# Patient Record
Sex: FEMALE | Race: OTHER | HISPANIC OR LATINO | Employment: OTHER | URBAN - METROPOLITAN AREA
[De-identification: names, ages, dates, MRNs, and addresses within clinical notes are randomized per-mention and may not be internally consistent; named-entity substitution may affect disease eponyms.]

---

## 2020-09-21 ENCOUNTER — HOSPITAL ENCOUNTER (EMERGENCY)
Facility: HOSPITAL | Age: 70
Discharge: HOME | End: 2020-09-21
Attending: EMERGENCY MEDICINE

## 2020-09-21 VITALS
SYSTOLIC BLOOD PRESSURE: 183 MMHG | RESPIRATION RATE: 16 BRPM | OXYGEN SATURATION: 98 % | DIASTOLIC BLOOD PRESSURE: 80 MMHG | TEMPERATURE: 98.8 F | HEART RATE: 78 BPM

## 2020-09-21 DIAGNOSIS — Z76.0 MEDICATION REFILL: Primary | ICD-10-CM

## 2020-09-21 PROCEDURE — 99281 EMR DPT VST MAYX REQ PHY/QHP: CPT

## 2020-09-21 PROCEDURE — 99284 EMERGENCY DEPT VISIT MOD MDM: CPT | Performed by: EMERGENCY MEDICINE

## 2020-09-21 RX ORDER — LISINOPRIL 20 MG/1
20 TABLET ORAL DAILY
Qty: 30 TABLET | Refills: 0 | Status: SHIPPED | OUTPATIENT
Start: 2020-09-21 | End: 2021-01-17 | Stop reason: SDUPTHER

## 2020-09-21 NOTE — DISCHARGE INSTRUCTIONS
If you plan to stay longer, need evaluation, please establish care with family doctor  Return to ER if any symptoms

## 2020-09-21 NOTE — ED PROVIDER NOTES
History  Chief Complaint   Patient presents with    Medication Refill     Pt reports traveling from [facility]   pt had last lisinopril pill today  pt reports she needs more  Pt denies cp/sob  80-year-old female presenting with request for medication refill  Patient is visiting from [facility], Polish-speaking only, daughter at bedside translating  Reports that she takes 20 mg lisinopril daily and took her last dose today  She just traveled here yesterday and is planning to return in about 1 month  Her only other medication is a baby aspirin daily  She offers no complaints  Specifically denies any fevers, chills, headache, CP, SOB, cough, abdominal pain, leg pain or swelling  Will refill prescription, information provided to establish care with the clinic if she stays longer and needs further medication          None       Past Medical History:   Diagnosis Date    Hypertension        History reviewed  No pertinent surgical history  History reviewed  No pertinent family history  I have reviewed and agree with the history as documented  E-Cigarette/Vaping     E-Cigarette/Vaping Substances     Social History     Tobacco Use    Smoking status: Never Smoker    Smokeless tobacco: Never Used   Substance Use Topics    Alcohol use: Never     Frequency: Never    Drug use: Never       Review of Systems   Constitutional: Negative for chills and fever  HENT: Negative for rhinorrhea and sore throat  Respiratory: Negative for cough and shortness of breath  Cardiovascular: Negative for chest pain and leg swelling  Gastrointestinal: Negative for abdominal pain, nausea and vomiting  Genitourinary: Negative for dysuria and urgency  Musculoskeletal: Negative for back pain and neck pain  Skin: Negative for color change and rash  Allergic/Immunologic: Negative for immunocompromised state  Neurological: Negative for weakness, numbness and headaches  Hematological: Negative for adenopathy  Does not bruise/bleed easily  Psychiatric/Behavioral: Negative for agitation and confusion  All other systems reviewed and are negative  Physical Exam  Physical Exam  Vitals signs and nursing note reviewed  Constitutional:       Appearance: Normal appearance  She is well-developed  HENT:      Head: Normocephalic and atraumatic  Nose: Nose normal    Eyes:      Conjunctiva/sclera: Conjunctivae normal    Neck:      Musculoskeletal: Normal range of motion and neck supple  Cardiovascular:      Rate and Rhythm: Normal rate and regular rhythm  Heart sounds: Normal heart sounds  Pulmonary:      Effort: Pulmonary effort is normal  No respiratory distress  Breath sounds: Normal breath sounds  Abdominal:      General: There is no distension  Palpations: Abdomen is soft  Musculoskeletal:         General: No tenderness or deformity  Skin:     General: Skin is warm and dry  Findings: No rash  Neurological:      Mental Status: She is alert and oriented to person, place, and time  Motor: No abnormal muscle tone  Coordination: Coordination normal    Psychiatric:         Thought Content:  Thought content normal          Judgment: Judgment normal          Vital Signs  ED Triage Vitals [09/21/20 1543]   Temperature Pulse Respirations Blood Pressure SpO2   98 8 °F (37 1 °C) 78 16 (!) 183/80 98 %      Temp Source Heart Rate Source Patient Position - Orthostatic VS BP Location FiO2 (%)   Temporal Monitor Sitting Right arm --      Pain Score       --           Vitals:    09/21/20 1543   BP: (!) 183/80   Pulse: 78   Patient Position - Orthostatic VS: Sitting         Visual Acuity      ED Medications  Medications - No data to display    Diagnostic Studies  Results Reviewed     None                 No orders to display              Procedures  Procedures         ED Course                                       MDM  Number of Diagnoses or Management Options  Medication refill: Diagnosis management comments: 78 yo F presenting with request for Lisinopril refill as she is visiting from [place]  No complaints  Disposition  Final diagnoses:   Medication refill     Time reflects when diagnosis was documented in both MDM as applicable and the Disposition within this note     Time User Action Codes Description Comment    9/21/2020  3:47 PM Emeritaalfredo Mcgarry Add [Z76 0] Medication refill       ED Disposition     ED Disposition Condition Date/Time Comment    Discharge Stable Mon Sep 21, 2020  3:47 PM Marcus Patel discharge to home/self care  Follow-up Information     Follow up With Specialties Details Why Contact Info Additional [address] [facility]   [address], [address] [facility] [address]  [address], [address], [address] [place], [place], [address]          Discharge Medication List as of 9/21/2020  3:48 PM      START taking these medications    Details   lisinopril (ZESTRIL) 20 mg tablet Take 1 tablet (20 mg total) by mouth daily, Starting Mon 9/21/2020, Print           No discharge procedures on file      PDMP Review     None          ED Provider  Electronically Signed by           Smitha Cadena DO  09/22/20 7182

## 2021-01-17 ENCOUNTER — HOSPITAL ENCOUNTER (EMERGENCY)
Facility: HOSPITAL | Age: 71
Discharge: HOME | End: 2021-01-17
Attending: EMERGENCY MEDICINE | Admitting: EMERGENCY MEDICINE

## 2021-01-17 VITALS
SYSTOLIC BLOOD PRESSURE: 174 MMHG | HEART RATE: 78 BPM | TEMPERATURE: 97.5 F | WEIGHT: 128.31 LBS | RESPIRATION RATE: 18 BRPM | DIASTOLIC BLOOD PRESSURE: 96 MMHG | OXYGEN SATURATION: 96 %

## 2021-01-17 DIAGNOSIS — H10.9 CONJUNCTIVITIS OF BOTH EYES, UNSPECIFIED CONJUNCTIVITIS TYPE: Primary | ICD-10-CM

## 2021-01-17 DIAGNOSIS — R21 RASH AND NONSPECIFIC SKIN ERUPTION: ICD-10-CM

## 2021-01-17 DIAGNOSIS — Z76.0 MEDICATION REFILL: ICD-10-CM

## 2021-01-17 PROCEDURE — 99282 EMERGENCY DEPT VISIT SF MDM: CPT | Performed by: PHYSICIAN ASSISTANT

## 2021-01-17 PROCEDURE — 99283 EMERGENCY DEPT VISIT LOW MDM: CPT

## 2021-01-17 RX ORDER — POLYMYXIN B SULFATE AND TRIMETHOPRIM 1; 10000 MG/ML; [USP'U]/ML
1 SOLUTION OPHTHALMIC EVERY 4 HOURS
Qty: 10 ML | Refills: 0 | Status: SHIPPED | OUTPATIENT
Start: 2021-01-17

## 2021-01-17 RX ORDER — LISINOPRIL 20 MG/1
20 TABLET ORAL DAILY
Qty: 30 TABLET | Refills: 0 | Status: SHIPPED | OUTPATIENT
Start: 2021-01-17 | End: 2021-04-20 | Stop reason: SDUPTHER

## 2021-01-17 NOTE — ED PROVIDER NOTES
History  Chief Complaint   Patient presents with   [address] speaking only  Pt daughter accompanying her  Pt reports redness of eyes x 4 days  Pt reports itching of eyes x 4 days  Pt now has a rash on L temple that formed after the itching began   Medical Problem     Pt is visiting here from [address] has been here for 3 monthes visiting her daughter  Pt has a hx of htn  Pt has 2 lisinipril 20 mg left  Pt would like a refill of her lisinpril  78 yo female presents to the ED for evaluation bilateral conjunctival redness, eye pruritus, eye tearing, and eye crusting upon waking up x 2 weeks  Patient denies any insinuating factors at onset to include chemical or trauma exposure to the eye  Patient denies use of any medications for her symptoms  Patient denies any changes to visual acuity  Patient also presents for a a nonspecific skin rash near the left cheekbone for the past several days  Patient reports using petroleum cream lotion without much improvement her symptoms  Patient denies history of similar symptoms in the past   Patient denies any allergic contacts  Patient is also requesting refill for lisinopril 20 mg q d  Cornell Wagoner Patient currently is visiting her daughter from [facility] and will not be getting back to [facility] until the end of the month, and only has 2 tablets of lisinopril left  Patient reports that she has a PCP back in [facility]   At this time patient denies any chest pain, shortness of breath, dyspnea, nausea, vomiting and GI/ complaints        History provided by:  Patient   used: Yes    Medical Problem  Associated symptoms: rash    Associated symptoms: no abdominal pain, no chest pain, no congestion, no cough, no diarrhea, no ear pain, no fatigue, no fever, no headaches, no myalgias, no nausea, no rhinorrhea, no shortness of breath, no sore throat and no vomiting        Prior to Admission Medications   Prescriptions Last Dose Informant Patient Reported? Taking?   lisinopril (ZESTRIL) 20 mg tablet   No No   Sig: Take 1 tablet (20 mg total) by mouth daily   lisinopril (ZESTRIL) 20 mg tablet   No No   Sig: Take 1 tablet (20 mg total) by mouth daily      Facility-Administered Medications: None       Past Medical History:   Diagnosis Date    Hypertension        History reviewed  No pertinent surgical history  History reviewed  No pertinent family history  I have reviewed and agree with the history as documented  E-Cigarette/Vaping     E-Cigarette/Vaping Substances     Social History     Tobacco Use    Smoking status: Never Smoker    Smokeless tobacco: Never Used   Substance Use Topics    Alcohol use: Never     Frequency: Never    Drug use: Never       Review of Systems   Constitutional: Negative for chills, diaphoresis, fatigue and fever  HENT: Negative for congestion, ear pain, rhinorrhea, sneezing and sore throat  Eyes: Positive for discharge, redness and itching  Negative for pain  Respiratory: Negative for cough and shortness of breath  Cardiovascular: Negative for chest pain and palpitations  Gastrointestinal: Negative for abdominal pain, constipation, diarrhea, nausea and vomiting  Genitourinary: Negative for difficulty urinating, dysuria and hematuria  Musculoskeletal: Negative for back pain, myalgias and neck pain  Skin: Positive for rash and wound  Negative for color change and pallor  Allergic/Immunologic: Negative for immunocompromised state  Neurological: Negative for dizziness, weakness, light-headedness, numbness and headaches  Hematological: Negative for adenopathy  Does not bruise/bleed easily  Psychiatric/Behavioral: Negative for behavioral problems  Physical Exam  Physical Exam  Vitals signs and nursing note reviewed  Constitutional:       General: She is not in acute distress  Appearance: Normal appearance  She is well-developed   She is not ill-appearing, toxic-appearing or diaphoretic  HENT:      Head: Normocephalic and atraumatic  Comments: Patient has a chronic mole at upper vermilion border  Right Ear: Tympanic membrane, ear canal and external ear normal  There is no impacted cerumen  Left Ear: Tympanic membrane, ear canal and external ear normal  There is no impacted cerumen  Nose: Nose normal  No congestion or rhinorrhea  Mouth/Throat:      Mouth: Mucous membranes are moist       Pharynx: Oropharynx is clear  No oropharyngeal exudate or posterior oropharyngeal erythema  Eyes:      General: No scleral icterus  Right eye: No foreign body or discharge  Left eye: No foreign body or discharge  Extraocular Movements: Extraocular movements intact  Right eye: Normal extraocular motion and no nystagmus  Left eye: Normal extraocular motion and no nystagmus  Conjunctiva/sclera:      Right eye: Right conjunctiva is injected  Left eye: Left conjunctiva is injected  Pupils: Pupils are equal, round, and reactive to light  Comments: Mild bilateral eye is conjunctival erythema  Mild clear discharge  No periorbital edema or erythema  Visual acuity grossly intact  Patient also has a nonspecific skin rash eruption on the left upper cheek bone  Rash is maculopapular and crusting with ill-defined borders  No evidence of abscess or cellulitis  Neck:      Musculoskeletal: Normal range of motion and neck supple  No neck rigidity  Cardiovascular:      Rate and Rhythm: Normal rate and regular rhythm  Pulses: Normal pulses  Heart sounds: Normal heart sounds  No murmur  Pulmonary:      Effort: Pulmonary effort is normal  No respiratory distress  Breath sounds: Normal breath sounds  No wheezing or rales  Abdominal:      Palpations: Abdomen is soft  Tenderness: There is no abdominal tenderness  Musculoskeletal:         General: No deformity or signs of injury        Right lower leg: No edema  Left lower leg: No edema  Lymphadenopathy:      Cervical: No cervical adenopathy  Skin:     General: Skin is warm and dry  Capillary Refill: Capillary refill takes less than 2 seconds  Coloration: Skin is not jaundiced or pale  Findings: Rash present  Neurological:      Mental Status: She is alert and oriented to person, place, and time  Sensory: No sensory deficit  Motor: No weakness  Gait: Gait normal    Psychiatric:         Mood and Affect: Mood normal          Behavior: Behavior normal          Vital Signs  ED Triage Vitals [01/17/21 1531]   Temperature Pulse Respirations Blood Pressure SpO2   97 5 °F (36 4 °C) 78 18 (!) 174/96 96 %      Temp Source Heart Rate Source Patient Position - Orthostatic VS BP Location FiO2 (%)   Tympanic Monitor Sitting Left arm --      Pain Score       Worst Possible Pain           Vitals:    01/17/21 1531   BP: (!) 174/96   Pulse: 78   Patient Position - Orthostatic VS: Sitting         Visual Acuity  Visual Acuity      Most Recent Value   Visual acuity R eye is  20/40   Visual acuity Left eye is  20/50   Visual acuity in both eyes is  20/40   Wearing corrective eyewear/lenses? No   No corrective eyewear/lenses  Yes          ED Medications  Medications - No data to display    Diagnostic Studies  Results Reviewed     None                 No orders to display              Procedures  Procedures         ED Course                                           MDM  Number of Diagnoses or Management Options  Conjunctivitis of both eyes, unspecified conjunctivitis type: new and requires workup  Medication refill: new and requires workup  Rash and nonspecific skin eruption: new and requires workup  Diagnosis management comments: 80-year-old female presents to ED for multiple complaints 1st is bilateral eye complaints consistent with conjunctivitis   Advised patient of likely cause of symptoms is viral conjunctivitis considering history and physical exam findings  However will dispo with polymyxin eye drops  Regarding patient's nonspecific skin rash near left upper cheek bone advised patient that carcinoma cannot be ruled out at this time and is advised for her to follow up with the dermatology while she is here in the states or when she returns to [facility]   However, will treat with mupirocin to treat for other possible differential diagnosis of impetigo  Will refill her lisinopril 20 mg QD x 30 days  Advised patient that if she has to stay in the local area any longer that she should establish a primary care provider  Patient is stable  Vital signs stable  Patient stable at discharge  Vital signs stable at discharge  Discussed the most likely cause of current symptoms  Discussed in detail any red flag signs and symptoms that would require immediate follow-up care in the emergency room  Instructed to follow-up with primary care provider for reevaluation  Discussed all prescribed medications to include doses, use, and route  Patient was satisfied with discharge plan and was provided the opportunity to inquire about any questions or concerns regarding ED visit          Amount and/or Complexity of Data Reviewed  Review and summarize past medical records: yes  Discuss the patient with other providers: yes    Risk of Complications, Morbidity, and/or Mortality  Presenting problems: moderate  Diagnostic procedures: moderate  Management options: moderate    Patient Progress  Patient progress: stable      Disposition  Final diagnoses:   Conjunctivitis of both eyes, unspecified conjunctivitis type   Rash and nonspecific skin eruption   Medication refill     Time reflects when diagnosis was documented in both MDM as applicable and the Disposition within this note     Time User Action Codes Description Comment    1/17/2021  4:14 PM Burnis Viviane Add [H10 9] Conjunctivitis of both eyes, unspecified conjunctivitis type     1/17/2021  4:14 PM Leocadia Ruel Add [R21] Rash and nonspecific skin eruption     1/17/2021  4:15 PM Leocadia Ruel Add [Z76 0] Medication refill       ED Disposition     ED Disposition Condition Date/Time Comment    Discharge Stable Sun Jan 17, 2021  4:14 PM Vanice Alert Lexiiollet discharge to home/self care  Follow-up Information     Follow up With Specialties Details Why Contact Info Additional [address]   [address] [facility] [address] [facility] [address]  [facility] [address] , [facility] [address]          Patient's Medications   Discharge Prescriptions    MUPIROCIN (BACTROBAN) 2 % OINTMENT    Apply topically 3 (three) times a day       Start Date: 1/17/2021 End Date: --       Order Dose: --       Quantity: 15 g    Refills: 0    POLYMYXIN B-TRIMETHOPRIM (POLYTRIM) OPHTHALMIC SOLUTION    Administer 1 drop to both eyes every 4 (four) hours       Start Date: 1/17/2021 End Date: --       Order Dose: 1 drop       Quantity: 10 mL    Refills: 0     No discharge procedures on file      PDMP Review     None          ED Provider  Electronically Signed by           Fanny Abebe PA-C  01/17/21 4267

## 2021-03-06 ENCOUNTER — HOSPITAL ENCOUNTER (EMERGENCY)
Facility: HOSPITAL | Age: 71
Discharge: HOME | End: 2021-03-06
Attending: EMERGENCY MEDICINE

## 2021-03-06 VITALS
RESPIRATION RATE: 16 BRPM | OXYGEN SATURATION: 98 % | DIASTOLIC BLOOD PRESSURE: 74 MMHG | WEIGHT: 124.78 LBS | SYSTOLIC BLOOD PRESSURE: 189 MMHG | HEART RATE: 80 BPM | TEMPERATURE: 98.2 F

## 2021-03-06 DIAGNOSIS — T78.3XXA ANGIOEDEMA, INITIAL ENCOUNTER: Primary | ICD-10-CM

## 2021-03-06 PROCEDURE — 96361 HYDRATE IV INFUSION ADD-ON: CPT

## 2021-03-06 PROCEDURE — 99284 EMERGENCY DEPT VISIT MOD MDM: CPT | Performed by: PHYSICIAN ASSISTANT

## 2021-03-06 PROCEDURE — 99283 EMERGENCY DEPT VISIT LOW MDM: CPT

## 2021-03-06 PROCEDURE — 96375 TX/PRO/DX INJ NEW DRUG ADDON: CPT

## 2021-03-06 PROCEDURE — 96374 THER/PROPH/DIAG INJ IV PUSH: CPT

## 2021-03-06 RX ORDER — DIPHENHYDRAMINE HYDROCHLORIDE 50 MG/ML
12.5 INJECTION INTRAMUSCULAR; INTRAVENOUS ONCE
Status: COMPLETED | OUTPATIENT
Start: 2021-03-06 | End: 2021-03-06

## 2021-03-06 RX ORDER — SODIUM CHLORIDE 9 MG/ML
250 INJECTION, SOLUTION INTRAVENOUS CONTINUOUS
Status: DISCONTINUED | OUTPATIENT
Start: 2021-03-06 | End: 2021-03-06 | Stop reason: HOSPADM

## 2021-03-06 RX ORDER — DIPHENHYDRAMINE HCL 25 MG
25 TABLET ORAL EVERY 6 HOURS PRN
Qty: 30 TABLET | Refills: 0 | Status: SHIPPED | OUTPATIENT
Start: 2021-03-06

## 2021-03-06 RX ORDER — PREDNISONE 10 MG/1
TABLET ORAL
Qty: 30 TABLET | Refills: 0 | OUTPATIENT
Start: 2021-03-06 | End: 2021-04-20

## 2021-03-06 RX ORDER — METHYLPREDNISOLONE SODIUM SUCCINATE 125 MG/2ML
125 INJECTION, POWDER, LYOPHILIZED, FOR SOLUTION INTRAMUSCULAR; INTRAVENOUS ONCE
Status: COMPLETED | OUTPATIENT
Start: 2021-03-06 | End: 2021-03-06

## 2021-03-06 RX ADMIN — DIPHENHYDRAMINE HYDROCHLORIDE 12.5 MG: 50 INJECTION, SOLUTION INTRAMUSCULAR; INTRAVENOUS at 15:24

## 2021-03-06 RX ADMIN — SODIUM CHLORIDE 250 ML/HR: 0.9 INJECTION, SOLUTION INTRAVENOUS at 15:25

## 2021-03-06 RX ADMIN — METHYLPREDNISOLONE SODIUM SUCCINATE 125 MG: 125 INJECTION, POWDER, FOR SOLUTION INTRAMUSCULAR; INTRAVENOUS at 15:24

## 2021-03-06 NOTE — ED PROVIDER NOTES
History  Chief Complaint   Patient presents with    Allergic Reaction     States this morning she ate normally, while she was cleaning she began to feel her lips swell, painful to touch  Denies new lotions, soaps, foods, perfumes  Denies SOB, no itching  Pt with upper and lower lip swelling aroung lunchtime today,  Pt is on lisinopril for htn            None       Past Medical History:   Diagnosis Date    Hypertension        Past Surgical History:   Procedure Laterality Date     SECTION         History reviewed  No pertinent family history  I have reviewed and agree with the history as documented  E-Cigarette/Vaping     E-Cigarette/Vaping Substances     Social History     Tobacco Use    Smoking status: Never Smoker    Smokeless tobacco: Never Used   Substance Use Topics    Alcohol use: Never     Frequency: Never    Drug use: Never       Review of Systems   Constitutional: Negative  HENT: Negative  Eyes: Negative  Respiratory: Negative  Cardiovascular: Negative  Gastrointestinal: Negative  Endocrine: Negative  Genitourinary: Negative  Musculoskeletal: Negative  Skin: Negative  Allergic/Immunologic: Negative  Neurological: Negative  Hematological: Negative  All other systems reviewed and are negative  Physical Exam  Physical Exam  Vitals signs and nursing note reviewed  Constitutional:       Appearance: Normal appearance  She is normal weight  Comments: 450pm pt is feeling better  Swelling much improved  Still with minimal swelling    HENT:      Head: Normocephalic and atraumatic  Right Ear: Tympanic membrane, ear canal and external ear normal       Left Ear: Tympanic membrane, ear canal and external ear normal       Mouth/Throat:      Mouth: Mucous membranes are moist       Pharynx: Oropharynx is clear        Comments: Upper lip swelling moderate  Lower lip minor swelling  Tongue and pharynx wnl   Eyes:      Extraocular Movements: Extraocular movements intact  Conjunctiva/sclera: Conjunctivae normal       Pupils: Pupils are equal, round, and reactive to light  Neck:      Musculoskeletal: Normal range of motion and neck supple  Cardiovascular:      Rate and Rhythm: Normal rate and regular rhythm  Pulses: Normal pulses  Heart sounds: Normal heart sounds  Pulmonary:      Effort: Pulmonary effort is normal    Abdominal:      General: Abdomen is flat  Bowel sounds are normal       Palpations: Abdomen is soft  Musculoskeletal: Normal range of motion  Skin:     General: Skin is warm  Capillary Refill: Capillary refill takes less than 2 seconds  Neurological:      General: No focal deficit present  Mental Status: She is alert and oriented to person, place, and time  Vital Signs  ED Triage Vitals [03/06/21 1457]   Temperature Pulse Respirations Blood Pressure SpO2   98 2 °F (36 8 °C) 90 20 (!) 130/101 98 %      Temp Source Heart Rate Source Patient Position - Orthostatic VS BP Location FiO2 (%)   Tympanic Monitor Sitting Left arm --      Pain Score       --           Vitals:    03/06/21 1457 03/06/21 1602 03/06/21 1740   BP: (!) 130/101 (!) 190/98 (!) 189/74   Pulse: 90  80   Patient Position - Orthostatic VS: Sitting  Sitting         Visual Acuity      ED Medications  Medications   diphenhydrAMINE (BENADRYL) injection 12 5 mg (12 5 mg Intravenous Given 3/6/21 1524)   methylPREDNISolone sodium succinate (Solu-MEDROL) injection 125 mg (125 mg Intravenous Given 3/6/21 1524)       Diagnostic Studies  Results Reviewed     None                 No orders to display              Procedures  Procedures         ED Course                             SBIRT 22yo+      Most Recent Value   SBIRT (24 yo +)   In order to provide better care to our patients, we are screening all of our patients for alcohol and drug use  Would it be okay to ask you these screening questions?   Yes Filed at: 03/06/2021 1528   Initial Alcohol Screen: US AUDIT-C    1  How often do you have a drink containing alcohol?  0 Filed at: 03/06/2021 1528   2  How many drinks containing alcohol do you have on a typical day you are drinking? 0 Filed at: 03/06/2021 1528   3a  Male UNDER 65: How often do you have five or more drinks on one occasion? 0 Filed at: 03/06/2021 1528   3b  FEMALE Any Age, or MALE 65+: How often do you have 4 or more drinks on one occassion? 0 Filed at: 03/06/2021 1528   Audit-C Score  0 Filed at: 03/06/2021 1528   MILKA: How many times in the past year have you    Used an illegal drug or used a prescription medication for non-medical reasons? Never Filed at: 03/06/2021 1528                    MDM    Disposition  Final diagnoses:   Angioedema, initial encounter     Time reflects when diagnosis was documented in both MDM as applicable and the Disposition within this note     Time User Action Codes Description Comment    3/6/2021  4:54 PM Paul Stoll [T78  3XXA] Angioedema, initial encounter       ED Disposition     ED Disposition Condition Date/Time Comment    Discharge Stable Sat Mar 6, 2021  4:54 PM Ramiro Burley discharge to home/self care  Follow-up Information     Follow up With Specialties Details Why Contact Info    Saira Rivero MD Family Medicine  stop the lisinopril  talk to family doctor for new hypertension [facility] [address] [phone]            Discharge Medication List as of 3/6/2021  4:56 PM      START taking these medications    Details   diphenhydrAMINE (BENADRYL) 25 mg tablet Take 1 tablet (25 mg total) by mouth every 6 (six) hours as needed for itching, Starting Sat 3/6/2021, Print      predniSONE 10 mg tablet 5 tabs po qd x 2 days then 4 tabs po qd x 2 days then 3 tabs po qd x 2 days then 2 tabs po qd x 2 days then 1 tab po qd x 2 days, Print           No discharge procedures on file      PDMP Review     None          ED Provider  Electronically Signed by Darrius Herrera PA-C  03/07/21 5806

## 2021-03-06 NOTE — ED NOTES
reporte to provide Delorise Gibes) the Patients BP was confirmed manually as well with a BP cuff 190/100 as requested by provider  No neuro deficits or complaints of pain Vital signs stable no respiratory distress, stridor, or SOB  Respirations easy non-labored       Samson Ontiveros RN  03/06/21 3756

## 2021-04-12 ENCOUNTER — HOSPITAL ENCOUNTER (EMERGENCY)
Facility: HOSPITAL | Age: 71
Discharge: HOME | End: 2021-04-12
Attending: EMERGENCY MEDICINE | Admitting: EMERGENCY MEDICINE
Payer: MEDICARE

## 2021-04-12 ENCOUNTER — APPOINTMENT (EMERGENCY)
Dept: CT IMAGING | Facility: HOSPITAL | Age: 71
End: 2021-04-12
Payer: MEDICARE

## 2021-04-12 VITALS
SYSTOLIC BLOOD PRESSURE: 144 MMHG | TEMPERATURE: 97.7 F | DIASTOLIC BLOOD PRESSURE: 79 MMHG | RESPIRATION RATE: 19 BRPM | OXYGEN SATURATION: 100 % | HEART RATE: 74 BPM | WEIGHT: 116 LBS

## 2021-04-12 DIAGNOSIS — R53.1 WEAKNESS: Primary | ICD-10-CM

## 2021-04-12 DIAGNOSIS — N39.0 URINARY TRACT INFECTION: ICD-10-CM

## 2021-04-12 LAB
ALBUMIN SERPL BCP-MCNC: 4.2 G/DL (ref 3–5.2)
ALP SERPL-CCNC: 90 U/L (ref 43–122)
ALT SERPL W P-5'-P-CCNC: 15 U/L
ANION GAP SERPL CALCULATED.3IONS-SCNC: 10 MMOL/L (ref 5–14)
AST SERPL W P-5'-P-CCNC: 24 U/L (ref 14–36)
BACTERIA UR QL AUTO: ABNORMAL /HPF
BASOPHILS # BLD AUTO: 0.1 THOUSANDS/ΜL (ref 0–0.1)
BASOPHILS NFR BLD AUTO: 1 % (ref 0–1)
BILIRUB SERPL-MCNC: 0.52 MG/DL
BILIRUB UR QL STRIP: NEGATIVE
BUN SERPL-MCNC: 14 MG/DL (ref 5–25)
CALCIUM SERPL-MCNC: 9.9 MG/DL (ref 8.4–10.2)
CAOX CRY URNS QL MICRO: ABNORMAL /HPF
CHLORIDE SERPL-SCNC: 100 MMOL/L (ref 97–108)
CLARITY UR: ABNORMAL
CO2 SERPL-SCNC: 28 MMOL/L (ref 22–30)
COLOR UR: ABNORMAL
CREAT SERPL-MCNC: 0.69 MG/DL (ref 0.6–1.2)
EOSINOPHIL # BLD AUTO: 0.3 THOUSAND/ΜL (ref 0–0.4)
EOSINOPHIL NFR BLD AUTO: 2 % (ref 0–6)
ERYTHROCYTE [DISTWIDTH] IN BLOOD BY AUTOMATED COUNT: 12.9 %
GFR SERPL CREATININE-BSD FRML MDRD: 88 ML/MIN/1.73SQ M
GLUCOSE SERPL-MCNC: 131 MG/DL (ref 70–99)
GLUCOSE UR STRIP-MCNC: NEGATIVE MG/DL
HCT VFR BLD AUTO: 41.3 % (ref 36–46)
HGB BLD-MCNC: 13.9 G/DL (ref 12–16)
HGB UR QL STRIP.AUTO: 10
KETONES UR STRIP-MCNC: NEGATIVE MG/DL
LEUKOCYTE ESTERASE UR QL STRIP: 500
LYMPHOCYTES # BLD AUTO: 2.9 THOUSANDS/ΜL (ref 0.5–4)
LYMPHOCYTES NFR BLD AUTO: 26 % (ref 25–45)
MCH RBC QN AUTO: 30.3 PG (ref 26–34)
MCHC RBC AUTO-ENTMCNC: 33.7 G/DL (ref 31–36)
MCV RBC AUTO: 90 FL (ref 80–100)
MONOCYTES # BLD AUTO: 0.9 THOUSAND/ΜL (ref 0.2–0.9)
MONOCYTES NFR BLD AUTO: 8 % (ref 1–10)
MUCOUS THREADS UR QL AUTO: ABNORMAL
NEUTROPHILS # BLD AUTO: 7 THOUSANDS/ΜL (ref 1.8–7.8)
NEUTS SEG NFR BLD AUTO: 63 % (ref 45–65)
NITRITE UR QL STRIP: NEGATIVE
NON-SQ EPI CELLS URNS QL MICRO: ABNORMAL /HPF
PH UR STRIP.AUTO: 6 [PH]
PLATELET # BLD AUTO: 276 THOUSANDS/UL (ref 150–450)
PMV BLD AUTO: 8 FL (ref 8.9–12.7)
POTASSIUM SERPL-SCNC: 3.5 MMOL/L (ref 3.6–5)
PROT SERPL-MCNC: 7.9 G/DL (ref 5.9–8.4)
PROT UR STRIP-MCNC: ABNORMAL MG/DL
RBC # BLD AUTO: 4.58 MILLION/UL (ref 4–5.2)
RBC #/AREA URNS AUTO: ABNORMAL /HPF
SODIUM SERPL-SCNC: 138 MMOL/L (ref 137–147)
SP GR UR STRIP.AUTO: 1.02 (ref 1–1.04)
TROPONIN I SERPL-MCNC: <0.01 NG/ML (ref 0–0.03)
UROBILINOGEN UA: NEGATIVE MG/DL
WBC # BLD AUTO: 11.2 THOUSAND/UL (ref 4.5–11)
WBC #/AREA URNS AUTO: ABNORMAL /HPF

## 2021-04-12 PROCEDURE — 99284 EMERGENCY DEPT VISIT MOD MDM: CPT

## 2021-04-12 PROCEDURE — 93005 ELECTROCARDIOGRAM TRACING: CPT

## 2021-04-12 PROCEDURE — 85025 COMPLETE CBC W/AUTO DIFF WBC: CPT | Performed by: PHYSICIAN ASSISTANT

## 2021-04-12 PROCEDURE — 80053 COMPREHEN METABOLIC PANEL: CPT | Performed by: PHYSICIAN ASSISTANT

## 2021-04-12 PROCEDURE — 70450 CT HEAD/BRAIN W/O DYE: CPT

## 2021-04-12 PROCEDURE — 36415 COLL VENOUS BLD VENIPUNCTURE: CPT | Performed by: PHYSICIAN ASSISTANT

## 2021-04-12 PROCEDURE — G1004 CDSM NDSC: HCPCS

## 2021-04-12 PROCEDURE — 74177 CT ABD & PELVIS W/CONTRAST: CPT

## 2021-04-12 PROCEDURE — 99284 EMERGENCY DEPT VISIT MOD MDM: CPT | Performed by: PHYSICIAN ASSISTANT

## 2021-04-12 PROCEDURE — 96360 HYDRATION IV INFUSION INIT: CPT

## 2021-04-12 PROCEDURE — 84484 ASSAY OF TROPONIN QUANT: CPT | Performed by: PHYSICIAN ASSISTANT

## 2021-04-12 PROCEDURE — 81003 URINALYSIS AUTO W/O SCOPE: CPT | Performed by: PHYSICIAN ASSISTANT

## 2021-04-12 PROCEDURE — 81001 URINALYSIS AUTO W/SCOPE: CPT | Performed by: PHYSICIAN ASSISTANT

## 2021-04-12 RX ORDER — ACETAMINOPHEN 500 MG
1000 TABLET ORAL EVERY 6 HOURS PRN
Qty: 30 TABLET | Refills: 0 | Status: SHIPPED | OUTPATIENT
Start: 2021-04-12

## 2021-04-12 RX ORDER — NITROFURANTOIN 25; 75 MG/1; MG/1
100 CAPSULE ORAL 2 TIMES DAILY
Qty: 10 CAPSULE | Refills: 0 | OUTPATIENT
Start: 2021-04-12 | End: 2021-04-20

## 2021-04-12 RX ADMIN — IOHEXOL 100 ML: 350 INJECTION, SOLUTION INTRAVENOUS at 20:42

## 2021-04-12 RX ADMIN — SODIUM CHLORIDE 1000 ML: 0.9 INJECTION, SOLUTION INTRAVENOUS at 19:48

## 2021-04-13 LAB
ATRIAL RATE: 75 BPM
P AXIS: 55 DEGREES
PR INTERVAL: 162 MS
QRS AXIS: 43 DEGREES
QRSD INTERVAL: 74 MS
QT INTERVAL: 396 MS
QTC INTERVAL: 442 MS
T WAVE AXIS: 26 DEGREES
VENTRICULAR RATE: 75 BPM

## 2021-04-13 PROCEDURE — 93010 ELECTROCARDIOGRAM REPORT: CPT | Performed by: INTERNAL MEDICINE

## 2021-04-13 NOTE — ED NOTES
Pt left ambulatory in stable condition, daughter taking pt home      Claire Bird, [address] [facility]  04/12/21 9293

## 2021-04-13 NOTE — ED PROVIDER NOTES
History  Chief Complaint   Patient presents with    Numbness     pt c/o decreased appetite and generalized weakness x 3 days  pt developed pain at back of head, radiating into her left arm yesterday  pt developed numbness in left hand at same time, is able use hand normal        61-year-old female presents to the ED for evaluation of multiple complaints to include decreased appetite, generalized weakness, urinary frequency, suprapubic tenderness, left hand numbness, head pain, and visual disturbance x 3 days  Patient reports that all symptoms have been gradual in onset  Hand pain described as diffuse extends bilateral into neck region  Visual disturbances described as blurriness  with normal visual acuity, and without eye pain  Denies history of similar symptoms in the past   Denies any fever, chest pain, shortness of breath, nausea, vomiting, hematuria and hematochezia  History provided by:  Patient   used: No        Prior to Admission Medications   Prescriptions Last Dose Informant Patient Reported? Taking? diphenhydrAMINE (BENADRYL) 25 mg tablet   No No   Sig: Take 1 tablet (25 mg total) by mouth every 6 (six) hours as needed for itching   predniSONE 10 mg tablet   No No   Si tabs po qd x 2 days then 4 tabs po qd x 2 days then 3 tabs po qd x 2 days then 2 tabs po qd x 2 days then 1 tab po qd x 2 days      Facility-Administered Medications: None       Past Medical History:   Diagnosis Date    Diabetes mellitus ([facility] [address] )     Hypertension        Past Surgical History:   Procedure Laterality Date     SECTION         History reviewed  No pertinent family history  I have reviewed and agree with the history as documented      E-Cigarette/Vaping     E-Cigarette/Vaping Substances     Social History     Tobacco Use    Smoking status: Never Smoker    Smokeless tobacco: Never Used   Substance Use Topics    Alcohol use: Never     Frequency: Never    Drug use: Never       Review of Systems   Constitutional: Positive for appetite change  Negative for chills, diaphoresis, fatigue and fever  HENT: Negative for congestion, rhinorrhea and sore throat  Eyes: Positive for visual disturbance (blurriness)  Negative for pain  Respiratory: Negative for cough, chest tightness, shortness of breath and wheezing  Cardiovascular: Negative for chest pain and palpitations  Gastrointestinal: Positive for abdominal pain (suprapubic)  Negative for abdominal distention, anal bleeding, blood in stool, constipation, diarrhea, nausea, rectal pain and vomiting  Genitourinary: Positive for frequency  Negative for difficulty urinating, dysuria, hematuria, urgency, vaginal bleeding and vaginal discharge  Musculoskeletal: Negative for back pain, myalgias and neck pain  Skin: Negative for color change, pallor and rash  Allergic/Immunologic: Negative for immunocompromised state  Neurological: Positive for weakness (generalized) and numbness (non specific R hand)  Negative for dizziness, light-headedness and headaches  Psychiatric/Behavioral: Negative for behavioral problems  Physical Exam  Physical Exam  Vitals signs and nursing note reviewed  Constitutional:       General: She is not in acute distress  Appearance: Normal appearance  She is well-developed  She is obese  She is not ill-appearing, toxic-appearing or diaphoretic  HENT:      Head: Normocephalic and atraumatic  Right Ear: Tympanic membrane, ear canal and external ear normal       Left Ear: Tympanic membrane, ear canal and external ear normal       Nose: Nose normal  No congestion or rhinorrhea  Mouth/Throat:      Mouth: Mucous membranes are moist       Pharynx: Oropharynx is clear  No oropharyngeal exudate or posterior oropharyngeal erythema  Eyes:      General: No scleral icterus  Right eye: No discharge  Left eye: No discharge  Extraocular Movements: Extraocular movements intact  Conjunctiva/sclera: Conjunctivae normal       Pupils: Pupils are equal, round, and reactive to light  Neck:      Musculoskeletal: Normal range of motion  No neck rigidity or muscular tenderness  Cardiovascular:      Rate and Rhythm: Normal rate and regular rhythm  Pulses: Normal pulses  Heart sounds: Normal heart sounds  Pulmonary:      Effort: Pulmonary effort is normal  No respiratory distress  Breath sounds: Normal breath sounds  No wheezing  Abdominal:      General: Bowel sounds are normal  There is no distension  Palpations: Abdomen is soft  There is no mass  Tenderness: There is abdominal tenderness (suprapubic)  There is no right CVA tenderness, left CVA tenderness, guarding or rebound  Hernia: No hernia is present  Musculoskeletal: Normal range of motion  General: No deformity or signs of injury  Lymphadenopathy:      Cervical: No cervical adenopathy  Skin:     General: Skin is warm and dry  Capillary Refill: Capillary refill takes less than 2 seconds  Coloration: Skin is not jaundiced or pale  Comments: Chronic mole on upper vermilion border   Neurological:      General: No focal deficit present  Mental Status: She is alert and oriented to person, place, and time  GCS: GCS eye subscore is 4  GCS verbal subscore is 5  GCS motor subscore is 6  Cranial Nerves: No cranial nerve deficit or dysarthria  Sensory: Sensation is intact  No sensory deficit  Motor: No weakness, tremor or seizure activity  Coordination: Coordination normal  Finger-Nose-Finger Test normal  Rapid alternating movements normal       Gait: Gait is intact  Gait and tandem walk normal       Deep Tendon Reflexes: Reflexes normal       Reflex Scores:       Patellar reflexes are 2+ on the right side and 2+ on the left side    Psychiatric:         Mood and Affect: Mood normal          Behavior: Behavior normal          Vital Signs  ED Triage Vitals [04/12/21 1929]   Temperature Pulse Respirations Blood Pressure SpO2   97 7 °F (36 5 °C) 81 18 (!) 188/115 98 %      Temp Source Heart Rate Source Patient Position - Orthostatic VS BP Location FiO2 (%)   Tympanic Monitor Sitting Left arm --      Pain Score       3           Vitals:    04/12/21 1929 04/12/21 2049 04/12/21 2119   BP: (!) 188/115 102/77 144/79   Pulse: 81 75 74   Patient Position - Orthostatic VS: Sitting Sitting Sitting         Visual Acuity      ED Medications  Medications   sodium chloride 0 9 % bolus 1,000 mL (0 mL Intravenous Stopped 4/12/21 2049)   iohexol (OMNIPAQUE) 350 MG/ML injection (SINGLE-DOSE) 100 mL (100 mL Intravenous Given 4/12/21 2042)       Diagnostic Studies  Results Reviewed     Procedure Component Value Units Date/Time    Urine Microscopic [[id number]]  (Abnormal) Collected: 04/12/21 2119    Lab Status: Final result Specimen: Urine, Clean Catch Updated: 04/12/21 2142     RBC, UA None Seen /hpf      WBC, UA 4-10 /hpf      Epithelial Cells Moderate /hpf      Bacteria, UA Occasional /hpf      Ca Oxalate Michelle, UA Moderate /hpf      MUCUS THREADS Occasional    UA w Reflex to Microscopic w Reflex to Culture [[id number]]  (Abnormal) Collected: 04/12/21 2119    Lab Status: Final result Specimen: Urine, Clean Catch Updated: 04/12/21 2130     Color, UA [place]     Clarity, UA Slightly Cloudy     Specific [place], UA 1 020     pH, UA 6 0     Leukocytes,  0     Nitrite, UA Negative     Protein,  (2+) mg/dl      Glucose, UA Negative mg/dl      Ketones, UA Negative mg/dl      Bilirubin, UA Negative     Blood, UA 10 0     UROBILINOGEN UA Negative mg/dL     Troponin I [[id number]]  (Normal) Collected: 04/12/21 1948    Lab Status: Final result Specimen: Blood from Arm, Right Updated: 04/12/21 2017     Troponin I <0 01 ng/mL     Comprehensive metabolic panel [[id number]]  (Abnormal) Collected: 04/12/21 1948    Lab Status: Final result Specimen: Blood from Arm, Right Updated: 04/12/21 2009 Sodium 138 mmol/L      Potassium 3 5 mmol/L      Chloride 100 mmol/L      CO2 28 mmol/L      ANION GAP 10 mmol/L      BUN 14 mg/dL      Creatinine 0 69 mg/dL      Glucose 131 mg/dL      Calcium 9 9 mg/dL      AST 24 U/L      ALT 15 U/L      Alkaline Phosphatase 90 U/L      Total Protein 7 9 g/dL      Albumin 4 2 g/dL      Total Bilirubin 0 52 mg/dL      eGFR 88 ml/min/1 73sq m     Narrative:      National Kid[employer] Disease Foundation guidelines for Chronic Kidney Disease (CKD):     Stage 1 with normal or high GFR (GFR > 90 mL/min/1 73 square meters)    Stage 2 Mild CKD (GFR = 60-89 mL/min/1 73 square meters)    Stage 3A Moderate CKD (GFR = 45-59 mL/min/1 73 square meters)    Stage 3B Moderate CKD (GFR = 30-44 mL/min/1 73 square meters)    Stage 4 Severe CKD (GFR = 15-29 mL/min/1 73 square meters)    Stage 5 End Stage CKD (GFR <15 mL/min/1 73 square meters)  Note: GFR calculation is accurate only with a steady state creatinine    CBC and differential [[id number]]  (Abnormal) Collected: 04/12/21 1948    Lab Status: Final result Specimen: Blood from Arm, Right Updated: 04/12/21 1956     WBC 11 20 Thousand/uL      RBC 4 58 Million/uL      Hemoglobin 13 9 g/dL      Hematocrit 41 3 %      MCV 90 fL      MCH 30 3 pg      MCHC 33 7 g/dL      RDW 12 9 %      MPV 8 0 fL      Platelets 250 Thousands/uL      Neutrophils Relative 63 %      Lymphocytes Relative 26 %      Monocytes Relative 8 %      Eosinophils Relative 2 %      Basophils Relative 1 %      Neutrophils Absolute 7 00 Thousands/µL      Lymphocytes Absolute 2 90 Thousands/µL      Monocytes Absolute 0 90 Thousand/µL      Eosinophils Absolute 0 30 Thousand/µL      Basophils Absolute 0 10 Thousands/µL                  CT abdomen pelvis with contrast   Final Result by Rajinder Hanson MD (04/12 2111)      No acute inflammatory process identified within the abdomen or pelvis              Workstation performed: [id number]         CT head without contrast   Final Result by Shannan Velásquez MD (04/12 2111)      No acute intracranial hemorrhage, midline shift, or mass effect  Workstation performed: [id number]                    Procedures  ECG 12 Lead Documentation Only    Date/Time: 4/12/2021 7:40 PM  Performed by: Lauren Hernández PA-C  Authorized by: Lauren Hernández PA-C     Comments:      Normal sinus rhythm  Nonspecific ST abnormality  Abnormal ECG  No changes from previous             ED Course                             SBIRT 20yo+      Most Recent Value   SBIRT (25 yo +)   In order to provide better care to our patients, we are screening all of our patients for alcohol and drug use  Would it be okay to ask you these screening questions? Yes Filed at: 04/12/2021 1931   Initial Alcohol Screen: US AUDIT-C    1  How often do you have a drink containing alcohol?  0 Filed at: 04/12/2021 1931   2  How many drinks containing alcohol do you have on a typical day you are drinking? 0 Filed at: 04/12/2021 1931   3b  FEMALE Any Age, or MALE 65+: How often do you have 4 or more drinks on one occassion? 0 Filed at: 04/12/2021 1931   Audit-C Score  0 Filed at: 04/12/2021 1931   MILKA: How many times in the past year have you    Used an illegal drug or used a prescription medication for non-medical reasons? Never Filed at: 04/12/2021 1931                    MDM  Number of Diagnoses or Management Options  Urinary tract infection: new and requires workup  Weakness: new and requires workup  Diagnosis management comments:   66-year-old female presents to ED for various nonspecific symptoms  Patient in no acute distress  Vital signs all stable  Neurologically intact  No focal deficits on exam   CT head without contrast and CT abdomen pelvis with contrast normal without any acute findings  UA revealed evidence of acute cystitis  Other labs noncontributory normal   Patient patient with treatment for acute UTI  Advised to continue follow-up with PCP    Also instructed to follow-up with ophthalmology  Patient comfortable at discharge  Provided strict return precautions  Amount and/or Complexity of Data Reviewed  Clinical lab tests: ordered and reviewed  Tests in the radiology section of CPT®: ordered and reviewed  Review and summarize past medical records: yes  Discuss the patient with other providers: yes  Independent visualization of images, tracings, or specimens: yes    Risk of Complications, Morbidity, and/or Mortality  Presenting problems: moderate  Diagnostic procedures: moderate  Management options: moderate    Patient Progress  Patient progress: stable      Disposition  Final diagnoses:   Weakness   Urinary tract infection     Time reflects when diagnosis was documented in both MDM as applicable and the Disposition within this note     Time User Action Codes Description Comment    4/12/2021  8:44 PM Olivier Wade Add [R53 1] Weakness     4/12/2021  9:32 PM Olivier Wade Add [N39 0] Urinary tract infection       ED Disposition     ED Disposition Condition Date/Time Comment    Discharge Stable Mon Apr 12, 2021  8:45 PM Tegan Martinez discharge to home/self care              Follow-up Information    None         Discharge Medication List as of 4/12/2021  9:34 PM      START taking these medications    Details   acetaminophen (TYLENOL) 500 mg tablet Take 2 tablets (1,000 mg total) by mouth every 6 (six) hours as needed for mild pain, Starting Mon 4/12/2021, Print      nitrofurantoin (MACROBID) 100 mg capsule Take 1 capsule (100 mg total) by mouth 2 (two) times a day, Starting Mon 4/12/2021, Print         CONTINUE these medications which have NOT CHANGED    Details   diphenhydrAMINE (BENADRYL) 25 mg tablet Take 1 tablet (25 mg total) by mouth every 6 (six) hours as needed for itching, Starting Sat 3/6/2021, Print      predniSONE 10 mg tablet 5 tabs po qd x 2 days then 4 tabs po qd x 2 days then 3 tabs po qd x 2 days then 2 tabs po qd x 2 days then 1 tab po qd x 2 days, Print           No discharge procedures on file      PDMP Review     None          ED Provider  Electronically Signed by           Dawn Patton PA-C  04/13/21 8206

## 2021-04-19 ENCOUNTER — APPOINTMENT (EMERGENCY)
Dept: RADIOLOGY | Facility: HOSPITAL | Age: 71
DRG: 305 | End: 2021-04-19
Payer: MEDICARE

## 2021-04-19 ENCOUNTER — APPOINTMENT (EMERGENCY)
Dept: CT IMAGING | Facility: HOSPITAL | Age: 71
DRG: 305 | End: 2021-04-19
Payer: MEDICARE

## 2021-04-19 ENCOUNTER — HOSPITAL ENCOUNTER (INPATIENT)
Facility: HOSPITAL | Age: 71
LOS: 1 days | Discharge: HOME | DRG: 305 | End: 2021-04-20
Attending: EMERGENCY MEDICINE | Admitting: INTERNAL MEDICINE
Payer: MEDICARE

## 2021-04-19 DIAGNOSIS — H53.8 BLURRED VISION: ICD-10-CM

## 2021-04-19 DIAGNOSIS — M79.10 MYALGIA: ICD-10-CM

## 2021-04-19 DIAGNOSIS — R10.2 SUPRAPUBIC PRESSURE: ICD-10-CM

## 2021-04-19 DIAGNOSIS — R07.9 CHEST PAIN: ICD-10-CM

## 2021-04-19 DIAGNOSIS — R53.1 GENERALIZED WEAKNESS: Primary | ICD-10-CM

## 2021-04-19 DIAGNOSIS — R51.9 HEADACHE: ICD-10-CM

## 2021-04-19 DIAGNOSIS — I16.0 HYPERTENSIVE URGENCY: ICD-10-CM

## 2021-04-19 DIAGNOSIS — Z76.0 MEDICATION REFILL: ICD-10-CM

## 2021-04-19 PROBLEM — E11.9 DIABETES MELLITUS (HCC): Status: ACTIVE | Noted: 2021-04-19

## 2021-04-19 PROBLEM — I10 HTN (HYPERTENSION): Status: ACTIVE | Noted: 2021-04-19

## 2021-04-19 LAB
ALBUMIN SERPL BCP-MCNC: 3.8 G/DL (ref 3.5–5)
ALP SERPL-CCNC: 86 U/L (ref 46–116)
ALT SERPL W P-5'-P-CCNC: 22 U/L (ref 12–78)
ANION GAP SERPL CALCULATED.3IONS-SCNC: 9 MMOL/L (ref 4–13)
AST SERPL W P-5'-P-CCNC: 40 U/L (ref 5–45)
ATRIAL RATE: 76 BPM
BASOPHILS # BLD AUTO: 0.07 THOUSANDS/ΜL (ref 0–0.1)
BASOPHILS NFR BLD AUTO: 1 % (ref 0–1)
BILIRUB SERPL-MCNC: 0.43 MG/DL (ref 0.2–1)
BUN SERPL-MCNC: 15 MG/DL (ref 5–25)
CALCIUM SERPL-MCNC: 9.5 MG/DL (ref 8.3–10.1)
CHLORIDE SERPL-SCNC: 101 MMOL/L (ref 100–108)
CO2 SERPL-SCNC: 30 MMOL/L (ref 21–32)
CREAT SERPL-MCNC: 0.92 MG/DL (ref 0.6–1.3)
EOSINOPHIL # BLD AUTO: 0.22 THOUSAND/ΜL (ref 0–0.61)
EOSINOPHIL NFR BLD AUTO: 2 % (ref 0–6)
ERYTHROCYTE [DISTWIDTH] IN BLOOD BY AUTOMATED COUNT: 12.3 % (ref 11.6–15.1)
FLUAV RNA RESP QL NAA+PROBE: NEGATIVE
FLUBV RNA RESP QL NAA+PROBE: NEGATIVE
GFR SERPL CREATININE-BSD FRML MDRD: 63 ML/MIN/1.73SQ M
GLUCOSE SERPL-MCNC: 103 MG/DL (ref 65–140)
HCT VFR BLD AUTO: 43.8 % (ref 34.8–46.1)
HGB BLD-MCNC: 14.2 G/DL (ref 11.5–15.4)
IMM GRANULOCYTES # BLD AUTO: 0.03 THOUSAND/UL (ref 0–0.2)
IMM GRANULOCYTES NFR BLD AUTO: 0 % (ref 0–2)
LIPASE SERPL-CCNC: 164 U/L (ref 73–393)
LYMPHOCYTES # BLD AUTO: 2.73 THOUSANDS/ΜL (ref 0.6–4.47)
LYMPHOCYTES NFR BLD AUTO: 28 % (ref 14–44)
MCH RBC QN AUTO: 30.1 PG (ref 26.8–34.3)
MCHC RBC AUTO-ENTMCNC: 32.4 G/DL (ref 31.4–37.4)
MCV RBC AUTO: 93 FL (ref 82–98)
MONOCYTES # BLD AUTO: 0.74 THOUSAND/ΜL (ref 0.17–1.22)
MONOCYTES NFR BLD AUTO: 8 % (ref 4–12)
NEUTROPHILS # BLD AUTO: 5.98 THOUSANDS/ΜL (ref 1.85–7.62)
NEUTS SEG NFR BLD AUTO: 61 % (ref 43–75)
NRBC BLD AUTO-RTO: 0 /100 WBCS
P AXIS: 74 DEGREES
PLATELET # BLD AUTO: 327 THOUSANDS/UL (ref 149–390)
PMV BLD AUTO: 9.8 FL (ref 8.9–12.7)
POTASSIUM SERPL-SCNC: 4 MMOL/L (ref 3.5–5.3)
PR INTERVAL: 174 MS
PROT SERPL-MCNC: 8 G/DL (ref 6.4–8.2)
QRS AXIS: 64 DEGREES
QRSD INTERVAL: 80 MS
QT INTERVAL: 374 MS
QTC INTERVAL: 420 MS
RBC # BLD AUTO: 4.72 MILLION/UL (ref 3.81–5.12)
RSV RNA RESP QL NAA+PROBE: NEGATIVE
SARS-COV-2 RNA RESP QL NAA+PROBE: NEGATIVE
SODIUM SERPL-SCNC: 140 MMOL/L (ref 136–145)
T WAVE AXIS: 16 DEGREES
TROPONIN I SERPL-MCNC: <0.02 NG/ML
TROPONIN I SERPL-MCNC: <0.02 NG/ML
VENTRICULAR RATE: 76 BPM
WBC # BLD AUTO: 9.77 THOUSAND/UL (ref 4.31–10.16)

## 2021-04-19 PROCEDURE — 96374 THER/PROPH/DIAG INJ IV PUSH: CPT

## 2021-04-19 PROCEDURE — 99284 EMERGENCY DEPT VISIT MOD MDM: CPT | Performed by: EMERGENCY MEDICINE

## 2021-04-19 PROCEDURE — 1124F ACP DISCUSS-NO DSCNMKR DOCD: CPT | Performed by: EMERGENCY MEDICINE

## 2021-04-19 PROCEDURE — 96361 HYDRATE IV INFUSION ADD-ON: CPT

## 2021-04-19 PROCEDURE — 71045 X-RAY EXAM CHEST 1 VIEW: CPT

## 2021-04-19 PROCEDURE — 84484 ASSAY OF TROPONIN QUANT: CPT | Performed by: INTERNAL MEDICINE

## 2021-04-19 PROCEDURE — 93005 ELECTROCARDIOGRAM TRACING: CPT

## 2021-04-19 PROCEDURE — 83690 ASSAY OF LIPASE: CPT | Performed by: EMERGENCY MEDICINE

## 2021-04-19 PROCEDURE — 99285 EMERGENCY DEPT VISIT HI MDM: CPT

## 2021-04-19 PROCEDURE — 99223 1ST HOSP IP/OBS HIGH 75: CPT | Performed by: INTERNAL MEDICINE

## 2021-04-19 PROCEDURE — 0241U HB NFCT DS VIR RESP RNA 4 TRGT: CPT | Performed by: EMERGENCY MEDICINE

## 2021-04-19 PROCEDURE — 84484 ASSAY OF TROPONIN QUANT: CPT | Performed by: EMERGENCY MEDICINE

## 2021-04-19 PROCEDURE — 85025 COMPLETE CBC W/AUTO DIFF WBC: CPT | Performed by: EMERGENCY MEDICINE

## 2021-04-19 PROCEDURE — 36415 COLL VENOUS BLD VENIPUNCTURE: CPT | Performed by: EMERGENCY MEDICINE

## 2021-04-19 PROCEDURE — 93010 ELECTROCARDIOGRAM REPORT: CPT | Performed by: INTERNAL MEDICINE

## 2021-04-19 PROCEDURE — 80053 COMPREHEN METABOLIC PANEL: CPT | Performed by: EMERGENCY MEDICINE

## 2021-04-19 PROCEDURE — 96375 TX/PRO/DX INJ NEW DRUG ADDON: CPT

## 2021-04-19 PROCEDURE — 70498 CT ANGIOGRAPHY NECK: CPT

## 2021-04-19 PROCEDURE — 70496 CT ANGIOGRAPHY HEAD: CPT

## 2021-04-19 RX ORDER — ONDANSETRON 2 MG/ML
4 INJECTION INTRAMUSCULAR; INTRAVENOUS ONCE
Status: COMPLETED | OUTPATIENT
Start: 2021-04-19 | End: 2021-04-19

## 2021-04-19 RX ORDER — KETOROLAC TROMETHAMINE 30 MG/ML
15 INJECTION, SOLUTION INTRAMUSCULAR; INTRAVENOUS ONCE
Status: COMPLETED | OUTPATIENT
Start: 2021-04-19 | End: 2021-04-19

## 2021-04-19 RX ORDER — ACETAMINOPHEN 325 MG/1
975 TABLET ORAL ONCE
Status: COMPLETED | OUTPATIENT
Start: 2021-04-19 | End: 2021-04-19

## 2021-04-19 RX ORDER — HYDRALAZINE HYDROCHLORIDE 20 MG/ML
5 INJECTION INTRAMUSCULAR; INTRAVENOUS EVERY 6 HOURS PRN
Status: DISCONTINUED | OUTPATIENT
Start: 2021-04-19 | End: 2021-04-20 | Stop reason: HOSPADM

## 2021-04-19 RX ORDER — LISINOPRIL 20 MG/1
20 TABLET ORAL DAILY
Status: DISCONTINUED | OUTPATIENT
Start: 2021-04-20 | End: 2021-04-20 | Stop reason: HOSPADM

## 2021-04-19 RX ORDER — ACETAMINOPHEN 325 MG/1
650 TABLET ORAL EVERY 6 HOURS PRN
Status: DISCONTINUED | OUTPATIENT
Start: 2021-04-19 | End: 2021-04-20 | Stop reason: HOSPADM

## 2021-04-19 RX ADMIN — IOHEXOL 85 ML: 350 INJECTION, SOLUTION INTRAVENOUS at 18:45

## 2021-04-19 RX ADMIN — ONDANSETRON 4 MG: 2 INJECTION INTRAMUSCULAR; INTRAVENOUS at 17:20

## 2021-04-19 RX ADMIN — HYDRALAZINE HYDROCHLORIDE 5 MG: 20 INJECTION, SOLUTION INTRAMUSCULAR; INTRAVENOUS at 23:50

## 2021-04-19 RX ADMIN — SODIUM CHLORIDE 1000 ML: 0.9 INJECTION, SOLUTION INTRAVENOUS at 17:20

## 2021-04-19 RX ADMIN — ACETAMINOPHEN 975 MG: 325 TABLET, FILM COATED ORAL at 17:20

## 2021-04-19 RX ADMIN — KETOROLAC TROMETHAMINE 15 MG: 30 INJECTION, SOLUTION INTRAMUSCULAR; INTRAVENOUS at 17:20

## 2021-04-19 NOTE — ED ATTENDING ATTESTATION
4/19/2021  Simin LOBATO, saw and evaluated the patient  I have discussed the patient with the resident/non-physician practitioner and agree with the resident's/non-physician practitioner's findings, Plan of Care, and MDM as documented in the resident's/non-physician practitioner's note, except where noted  All available labs and Radiology studies were reviewed  I was present for key portions of any procedure(s) performed by the resident/non-physician practitioner and I was immediately available to provide assistance  At this point I agree with the current assessment done in the Emergency Department  I have conducted an independent evaluation of this patient a history and physical is as follows:      A 77-year-old female with past history of diabetes and hypertension; presents with generalized malaise and myalgias, predominantly in her low back and legs  Patient states symptoms have been ongoing for the past week  Patient also complains of suprapubic discomfort however denies dysuria  Patient did have one episode of nausea and vomiting yesterday  Patient also reports an episode of chest pain earlier today, that began at rest and has since resolved  Patient otherwise denies fever, chills, shortness of breath, cough, abdominal pain, diarrhea, dysuria, peripheral edema and rashes  On review, patient was seen at [address] [place] [address] ED on 4/12 for similar symptoms  Undergoing lab work, CT head and CT abdomen/pelvis  Patient was ultimately discharged home on Macrobid for a possible urinary tract infection      Physical Exam  General Appearance: alert and oriented, nad, non toxic appearing  Skin:  Warm, dry, intact  HEENT: atraumatic, normocephalic  Neck: Supple, trachea midline  Cardiac: RRR; no murmurs, rub, gallops  Pulmonary: lungs CTAB; no wheezes, rales, rhonchi  Gastrointestinal: abdomen soft, nontender, nondistended; no guarding or rebound tenderness; good bowel sounds, no mass or bruits  Extremities:  no pedal edema, 2+ pulses; no calf tenderness, no clubbing, no cyanosis  Neuro:  no focal motor or sensory deficits, CN 2-12 grossly intact  Psych:  Normal mood and affect, normal judgement and insight    Assessment and Plan:  Generalized malaise, associated with myalgias  Patient also with an episode of chest pain earlier today  EKG reviewed and does have ST changes  Will check lab work and imaging for further evaluation        ED Course         Critical Care Time  Procedures

## 2021-04-19 NOTE — ED PROVIDER NOTES
History  Chief Complaint   Patient presents with    Weakness - Generalized     Generalized weakness, fatigue, body aches, headache  No known fever  66-year-old female history of diabetes and hypertension presenting with persistent viral symptoms and bladder discomfort  Patient reports symptoms ongoing for more than the past week  Was seen evaluated on the 12th with cardiac evaluation as well as scans of her head and abdomen that were unremarkable  Diagnosed with UTI  Patient reports she has not been taking any medication for her discomfort  Primarily complaining of generalized weakness, malaise, myalgias particularly in her legs and low back  She does not have the back discomfort when at rest and reports it is only when she walks  Reports she has also had some nausea and 1 episode of nonbloody nonbilious emesis yesterday  She was having some abdominal pain that started over a week ago but that has since resolved  She is now just complaining of some bladder discomfort and pressure without dysuria  Denies any headache or neck pain  Reports she did have some chest pain earlier today that began at rest and resolved spontaneously at rest   Nonpleuritic  No shortness of breath or cough  Reports she did have some diarrhea over week ago but has since resolved  Denies any other complaints at this time  Denies any tobacco, alcohol, drug use  Denies any headache, vision changes, or fever/chills  Prior to Admission Medications   Prescriptions Last Dose Informant Patient Reported?  Taking?   acetaminophen (TYLENOL) 500 mg tablet   No Yes   Sig: Take 2 tablets (1,000 mg total) by mouth every 6 (six) hours as needed for mild pain   amLODIPine (NORVASC) 10 mg tablet   Yes Yes   Sig: Take 10 mg by mouth daily    amLODIPine (NORVASC) 10 mg tablet   No No   Sig: Take 1 tablet (10 mg total) by mouth daily   aspirin 81 mg chewable tablet   Yes No   Sig: Chew 81 mg daily   atorvastatin (LIPITOR) 20 mg tablet Yes Yes   Sig: Take 20 mg by mouth daily   diphenhydrAMINE (BENADRYL) 25 mg tablet   No No   Sig: Take 1 tablet (25 mg total) by mouth every 6 (six) hours as needed for itching   lisinopril (ZESTRIL) 20 mg tablet   No Yes   Sig: Take 1 tablet (20 mg total) by mouth daily   lisinopril (ZESTRIL) 20 mg tablet   No No   Sig: Take 1 tablet (20 mg total) by mouth daily   metFORMIN (GLUCOPHAGE) 1000 MG tablet   Yes Yes   Sig: Take 1,000 mg by mouth   mupirocin (BACTROBAN) 2 % ointment   No No   Sig: Apply topically 3 (three) times a day   nitrofurantoin (MACROBID) 100 mg capsule   No Yes   Sig: Take 1 capsule (100 mg total) by mouth 2 (two) times a day   polymyxin b-trimethoprim (POLYTRIM) ophthalmic solution   No No   Sig: Administer 1 drop to both eyes every 4 (four) hours   predniSONE 10 mg tablet   No No   Si tabs po qd x 2 days then 4 tabs po qd x 2 days then 3 tabs po qd x 2 days then 2 tabs po qd x 2 days then 1 tab po qd x 2 days      Facility-Administered Medications: None       Past Medical History:   Diagnosis Date    Diabetes mellitus ([facility] [address] )     Hypertension        Past Surgical History:   Procedure Laterality Date     SECTION         History reviewed  No pertinent family history  I have reviewed and agree with the history as documented  E-Cigarette/Vaping     E-Cigarette/Vaping Substances     Social History     Tobacco Use    Smoking status: Never Smoker    Smokeless tobacco: Never Used   Substance Use Topics    Alcohol use: Never     Frequency: Never    Drug use: Never        Review of Systems   Constitutional: Negative for appetite change, chills, diaphoresis, fever and unexpected weight change  HENT: Negative for congestion and rhinorrhea  Eyes: Negative for photophobia and visual disturbance  Respiratory: Negative for cough, chest tightness and shortness of breath  Cardiovascular: Positive for chest pain  Negative for palpitations and leg swelling     Gastrointestinal: Positive for abdominal pain, diarrhea, nausea and vomiting  Negative for abdominal distention, blood in stool and constipation  Genitourinary: Positive for flank pain  Negative for dysuria and hematuria  Suprapubic pressure   Musculoskeletal: Positive for back pain  Negative for joint swelling, neck pain and neck stiffness  Skin: Negative for color change, pallor, rash and wound  Neurological: Negative for dizziness, syncope, weakness, light-headedness and headaches  Psychiatric/Behavioral: Negative for agitation  All other systems reviewed and are negative  Physical Exam  ED Triage Vitals [04/19/21 1536]   Temperature Pulse Respirations Blood Pressure SpO2   98 2 °F (36 8 °C) 85 18 143/91 98 %      Temp Source Heart Rate Source Patient Position - Orthostatic VS BP Location FiO2 (%)   Oral Monitor Lying Right arm --      Pain Score       Worst Possible Pain             Orthostatic Vital Signs  Vitals:    04/20/21 0030 04/20/21 0301 04/20/21 0745 04/20/21 1141   BP: 159/74 148/74 154/90 (!) 174/74   Pulse: 70 64 71 66   Patient Position - Orthostatic VS: Lying Lying Lying Sitting       Physical Exam  Vitals signs and nursing note reviewed  Constitutional:       General: She is not in acute distress  Appearance: Normal appearance  She is well-developed  She is not ill-appearing, toxic-appearing or diaphoretic  HENT:      Head: Normocephalic and atraumatic  Nose: Nose normal  No congestion or rhinorrhea  Mouth/Throat:      Mouth: Mucous membranes are moist       Pharynx: Oropharynx is clear  No oropharyngeal exudate or posterior oropharyngeal erythema  Eyes:      General: No scleral icterus  Right eye: No discharge  Left eye: No discharge  Extraocular Movements: Extraocular movements intact  Conjunctiva/sclera: Conjunctivae normal       Pupils: Pupils are equal, round, and reactive to light     Neck:      Musculoskeletal: Normal range of motion and neck supple  No neck rigidity or muscular tenderness  Vascular: No JVD  Trachea: No tracheal deviation  Cardiovascular:      Rate and Rhythm: Normal rate and regular rhythm  Heart sounds: Normal heart sounds  No murmur  No friction rub  No gallop  Pulmonary:      Effort: Pulmonary effort is normal  No respiratory distress  Breath sounds: No stridor  Wheezing present  No rales  Comments: Mild expiratory wheezing bilaterally  Chest:      Chest wall: No tenderness  Abdominal:      General: Bowel sounds are normal  There is no distension  Palpations: Abdomen is soft  Tenderness: There is no abdominal tenderness  There is no right CVA tenderness, left CVA tenderness, guarding or rebound  Comments: Mild bilateral lower quadrant and suprapubic tenderness palpation without guarding  Otherwise soft and nondistended  Normal bowel sounds throughout  No CVA tenderness bilaterally   Musculoskeletal: Normal range of motion  General: No swelling, tenderness, deformity or signs of injury  Right lower leg: No edema  Left lower leg: No edema  Comments: No midline neck or back tenderness palpation  Tight paraspinal musculature but nontender  Lymphadenopathy:      Cervical: No cervical adenopathy  Skin:     General: Skin is warm and dry  Coloration: Skin is not pale  Findings: No erythema or rash  Neurological:      General: No focal deficit present  Mental Status: She is alert and oriented to person, place, and time  Mental status is at baseline  Cranial Nerves: No cranial nerve deficit  Sensory: No sensory deficit  Motor: No weakness or abnormal muscle tone  Coordination: Coordination normal       Gait: Gait normal       Comments: A&Ox3 to person, place, and time  CN 2-12 intact  Strength 5/5 throughout  Sensation grossly intact  Cerebellar exam including gait intact     Psychiatric:         Behavior: Behavior normal  Thought Content:  Thought content normal          ED Medications  Medications   sodium chloride 0 9 % bolus 1,000 mL (0 mL Intravenous Stopped 4/19/21 1830)   ondansetron (ZOFRAN) injection 4 mg (4 mg Intravenous Given 4/19/21 1720)   acetaminophen (TYLENOL) tablet 975 mg (975 mg Oral Given 4/19/21 1720)   ketorolac (TORADOL) injection 15 mg (15 mg Intravenous Given 4/19/21 1720)   iohexol (OMNIPAQUE) 350 MG/ML injection (MULTI-DOSE) 85 mL (85 mL Intravenous Given 4/19/21 1845)   potassium chloride (K-DUR,KLOR-CON) CR tablet 40 mEq (40 mEq Oral Given 4/20/21 1139)       Diagnostic Studies  Results Reviewed     Procedure Component Value Units Date/Time    Fingerstick Glucose (POCT) [[id number]]  (Normal) Collected: 04/20/21 1206    Lab Status: Final result Updated: 04/20/21 1632     POC Glucose 114 mg/dl     Fingerstick Glucose (POCT) [[id number]]  (Normal) Collected: 04/20/21 0749    Lab Status: Final result Updated: 04/20/21 0819     POC Glucose 92 mg/dl     Comprehensive metabolic panel [[id number]]  (Abnormal) Collected: 04/20/21 0437    Lab Status: Final result Specimen: Blood from Arm, Left Updated: 04/20/21 0549     Sodium 142 mmol/L      Potassium 3 2 mmol/L      Chloride 103 mmol/L      CO2 31 mmol/L      ANION GAP 8 mmol/L      BUN 12 mg/dL      Creatinine 0 78 mg/dL      Glucose 92 mg/dL      Calcium 8 6 mg/dL      AST 22 U/L      ALT 16 U/L      Alkaline Phosphatase 74 U/L      Total Protein 7 1 g/dL      Albumin 3 5 g/dL      Total Bilirubin 0 27 mg/dL      eGFR 77 ml/min/1 73sq m     Narrative:      Deny guidelines for Chronic Kidney Disease (CKD):     Stage 1 with normal or high GFR (GFR > 90 mL/min/1 73 square meters)    Stage 2 Mild CKD (GFR = 60-89 mL/min/1 73 square meters)    Stage 3A Moderate CKD (GFR = 45-59 mL/min/1 73 square meters)    Stage 3B Moderate CKD (GFR = 30-44 mL/min/1 73 square meters)    Stage 4 Severe CKD (GFR = 15-29 mL/min/1 73 square meters)   Stage 5 End Stage CKD (GFR <15 mL/min/1 73 square meters)  Note: GFR calculation is accurate only with a steady state creatinine    CBC and differential [[id number]]  (Abnormal) Collected: 04/20/21 0437    Lab Status: Final result Specimen: Blood from Arm, Left Updated: 04/20/21 0512     WBC 10 69 Thousand/uL      RBC 4 44 Million/uL      Hemoglobin 13 4 g/dL      Hematocrit 41 0 %      MCV 92 fL      MCH 30 2 pg      MCHC 32 7 g/dL      RDW 12 4 %      MPV 10 6 fL      Platelets 587 Thousands/uL      nRBC 0 /100 WBCs      Neutrophils Relative 54 %      Immat GRANS % 1 %      Lymphocytes Relative 34 %      Monocytes Relative 7 %      Eosinophils Relative 3 %      Basophils Relative 1 %      Neutrophils Absolute 5 94 Thousands/µL      Immature Grans Absolute 0 05 Thousand/uL      Lymphocytes Absolute 3 65 Thousands/µL      Monocytes Absolute 0 70 Thousand/µL      Eosinophils Absolute 0 28 Thousand/µL      Basophils Absolute 0 07 Thousands/µL     Troponin I [[id number]]  (Normal) Collected: 04/20/21 0258    Lab Status: Final result Specimen: Blood from Arm, Left Updated: 04/20/21 0358     Troponin I <0 02 ng/mL     Urinalysis with microscopic [[id number]]  (Abnormal) Collected: 04/20/21 0131    Lab Status: Final result Specimen: Urine, Clean Catch Updated: 04/20/21 0218     Clarity, UA Clear     Color, UA Yellow     Specific Gravity, UA 1 010     pH, UA 5 0     Glucose, UA Negative mg/dl      Ketones, UA Negative mg/dl      Blood, UA Negative     Protein, UA Negative mg/dl      Nitrite, UA Negative     Bilirubin, UA Negative     Urobilinogen, UA 0 2 E U /dl      Leukocytes, UA Negative     WBC, UA 1-2 /hpf      RBC, UA 0-1 /hpf      Bacteria, UA Occasional /hpf      OTHER OBSERVATIONS Transitional Epithelial Cells     Epithelial Cells Occasional /hpf     Troponin I [[id number]]  (Normal) Collected: 04/19/21 2357    Lab Status: Final result Specimen: Blood from Arm, Right Updated: 04/20/21 0035     Troponin I <0 02 ng/mL Troponin I [[id number]]  (Normal) Collected: 04/19/21 2053    Lab Status: Final result Specimen: Blood from Arm, Left Updated: 04/19/21 2150     Troponin I <0 02 ng/mL     COVID19, Influenza A/B, RSV PCR, [facility] [[id number]]  (Normal) Collected: 04/19/21 1720    Lab Status: Final result Specimen: Nares Updated: 04/19/21 2036     SARS-CoV-2 Negative     INFLUENZA A PCR Negative     INFLUENZA B PCR Negative     RSV PCR Negative    Narrative: This test has been authorized by FDA under an EUA (Emergency Use Assay) for use by authorized laboratories  Clinical caution and judgement should be used with the interpretation of these results with consideration of the clinical impression and other laboratory testing  Testing reported as "Positive" or "Negative" has been proven to be accurate according to standard laboratory validation requirements  All testing is performed with control materials showing appropriate reactivity at standard intervals      Troponin I [[id number]]  (Normal) Collected: 04/19/21 1720    Lab Status: Final result Specimen: Blood from Arm, Left Updated: 04/19/21 1816     Troponin I <0 02 ng/mL     Comprehensive metabolic panel [[id number]] Collected: 04/19/21 1720    Lab Status: Final result Specimen: Blood from Arm, Left Updated: 04/19/21 1813     Sodium 140 mmol/L      Potassium 4 0 mmol/L      Chloride 101 mmol/L      CO2 30 mmol/L      ANION GAP 9 mmol/L      BUN 15 mg/dL      Creatinine 0 92 mg/dL      Glucose 103 mg/dL      Calcium 9 5 mg/dL      AST 40 U/L      ALT 22 U/L      Alkaline Phosphatase 86 U/L      Total Protein 8 0 g/dL      Albumin 3 8 g/dL      Total Bilirubin 0 43 mg/dL      eGFR 63 ml/min/1 73sq m     Narrative:      [facility] guidelines for Chronic Kidney Disease (CKD):     Stage 1 with normal or high GFR (GFR > 90 mL/min/1 73 square meters)    Stage 2 Mild CKD (GFR = 60-89 mL/min/1 73 square meters)    Stage 3A Moderate CKD (GFR = 45-59 mL/min/1 73 square meters)    Stage 3B Moderate CKD (GFR = 30-44 mL/min/1 73 square meters)    Stage 4 Severe CKD (GFR = 15-29 mL/min/1 73 square meters)    Stage 5 End Stage CKD (GFR <15 mL/min/1 73 square meters)  Note: GFR calculation is accurate only with a steady state creatinine    Lipase [[id number]]  (Normal) Collected: 04/19/21 1720    Lab Status: Final result Specimen: Blood from Arm, Left Updated: 04/19/21 1813     Lipase 164 u/L     CBC and differential [[id number]] Collected: 04/19/21 1720    Lab Status: Final result Specimen: Blood from Arm, Left Updated: 04/19/21 1747     WBC 9 77 Thousand/uL      RBC 4 72 Million/uL      Hemoglobin 14 2 g/dL      Hematocrit 43 8 %      MCV 93 fL      MCH 30 1 pg      MCHC 32 4 g/dL      RDW 12 3 %      MPV 9 8 fL      Platelets 682 Thousands/uL      nRBC 0 /100 WBCs      Neutrophils Relative 61 %      Immat GRANS % 0 %      Lymphocytes Relative 28 %      Monocytes Relative 8 %      Eosinophils Relative 2 %      Basophils Relative 1 %      Neutrophils Absolute 5 98 Thousands/µL      Immature Grans Absolute 0 03 Thousand/uL      Lymphocytes Absolute 2 73 Thousands/µL      Monocytes Absolute 0 74 Thousand/µL      Eosinophils Absolute 0 22 Thousand/µL      Basophils Absolute 0 07 Thousands/µL                  CTA head and neck with and without contrast   Final Result by Kirti Zhang MD (04/19 1924)         1  No Evidence of acute intracranial hemorrhage  2  No evidence of hemodynamic significant stenosis, aneurysm or dissection  3  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 thalamic infarct and stable left frontal calcified 1 cm meningioma  Workstation performed: [id number]         XR chest portable   Final Result by Dimple Garibay MD (04/20 3148)      No acute cardiopulmonary disease                    Workstation performed: [id number]               Procedures  ECG 12 Lead Documentation Only    Date/Time: 4/19/2021 5:18 PM  Performed by: Jose Vital MD  Authorized by: Jose Vital MD     Indications / Diagnosis:  CP  ECG reviewed by me, the ED Provider: yes    Patient location:  ED  Previous ECG:     Previous ECG:  Compared to current    Similarity:  No change    Comparison to cardiac monitor: Yes    Interpretation:     Interpretation: abnormal    Quality:     Tracing quality:  Limited by artifact  Rate:     ECG rate:  76    ECG rate assessment: normal    Rhythm:     Rhythm: sinus rhythm    Ectopy:     Ectopy: none    QRS:     QRS axis:  Normal    QRS intervals:  Normal  Conduction:     Conduction: normal    ST segments:     ST segments:  Abnormal    Depression:  V5 and V6  T waves:     T waves: non-specific            ED Course             HEART Risk Score      Most Recent Value   Heart Score Risk Calculator   History  1 Filed at: 04/19/2021 2024   ECG  1 Filed at: 04/19/2021 2024   Age  2 Filed at: 04/19/2021 2024   Risk Factors  1 Filed at: 04/19/2021 2024   Troponin  0 Filed at: 04/19/2021 2024   HEART Score  5 Filed at: 04/19/2021 2024                      SBIRT 22yo+      Most Recent Value   SBIRT (25 yo +)   In order to provide better care to our patients, we are screening all of our patients for alcohol and drug use  Would it be okay to ask you these screening questions? No Filed at: 04/19/2021 2134                MDM  Number of Diagnoses or Management Options  Blurred vision:   Chest pain:   Generalized weakness:   Headache:   Myalgia:   Suprapubic pressure:   Diagnosis management comments: 25-year-old female history of diabetes and hypertension presenting with persistent viral symptoms and bladder discomfort  Patient seen evaluated for very similar symptoms approximately 1 week ago  Patient reports resolution of some of the symptoms but persistence of others  Primarily with generalized weakness and myalgias  Concerning for viral infection or possibly bacterial given diagnosis of UTI 1 week ago  Patient reports not taking medications at home    Symptoms could all be complication of medication noncompliance  Given wheezing on exam, plan for chest x-ray  Oxygenation and work of breathing normal   No known lung history or smoking  Cardiac eval x1 given earlier chest pain that resolved spontaneously by arrival   Urinalysis  Basic labs including abdominal labs given suprapubic discomfort  Plan for or Tylenol and IV Zofran and IV Toradol  COVID test   IV fluids  Reassess  EKG with slightly worsening lateral ST depressions  Labs no acute process  Troponin negative  Chest x-ray no acute process  Headache almost completely resolved and chest pain myalgias resolved after medications  Patient also reporting bilateral blurriness and left arm numbness tingling ongoing the last 5-7 days  No worsening today  CTA head and neck no acute process  Heart score 5  Admitted for chest pain rule out         Amount and/or Complexity of Data Reviewed  Clinical lab tests: reviewed and ordered  Tests in the radiology section of CPT®: reviewed and ordered  Tests in the medicine section of CPT®: reviewed and ordered  Review and summarize past medical records: yes  Independent visualization of images, tracings, or specimens: yes    Risk of Complications, Morbidity, and/or Mortality  Presenting problems: moderate  Diagnostic procedures: moderate  Management options: moderate    Patient Progress  Patient progress: improved      Disposition  Final diagnoses:   Generalized weakness   Myalgia   Chest pain   Headache   Blurred vision   Suprapubic pressure     Time reflects when diagnosis was documented in both MDM as applicable and the Disposition within this note     Time User Action Codes Description Comment    4/19/2021  8:23 PM Radha Fill Add [R53 1] Generalized weakness     4/19/2021  8:24 PM Radha Fill Add [M79 10] Myalgia     4/19/2021  8:24 PM Radha Fill Add [R07 9] Chest pain     4/19/2021  8:24 PM Radha Fill Add [R51 9] Headache     4/19/2021  8:24 PM Radha Fill Add [H53 8] Blurred vision 4/19/2021  8:24 PM Vance Gonzalez Add [R10 2] Suprapubic pressure     4/20/2021 10:19 AM Jade Alves Add [Z76 0] Medication refill     4/20/2021 10:29 AM Richy Issa Add [I16 0] Hypertensive urgency       ED Disposition     ED Disposition Condition Date/Time Comment    Admit Stable Mon Apr 19, 2021  8:52 PM Case was discussed with CHERELLE and the patient's admission status was agreed to be Admission Status: inpatient status to the service of Dr Naya Stokes           Follow-up Information     Follow up With Specialties Details Why Contact Info    Dawn Part, DO Family Medicine Schedule an appointment as soon as possible for a visit in 1 week(s)  [address] [place] [address]  [phone]            Discharge Medication List as of 4/20/2021  1:29 PM      CONTINUE these medications which have CHANGED    Details   amLODIPine (NORVASC) 10 mg tablet Take 1 tablet (10 mg total) by mouth daily, Starting Tue 4/20/2021, Normal      lisinopril (ZESTRIL) 20 mg tablet Take 1 tablet (20 mg total) by mouth daily, Starting Tue 4/20/2021, Normal         CONTINUE these medications which have NOT CHANGED    Details   acetaminophen (TYLENOL) 500 mg tablet Take 2 tablets (1,000 mg total) by mouth every 6 (six) hours as needed for mild pain, Starting Mon 4/12/2021, Print      atorvastatin (LIPITOR) 20 mg tablet Take 20 mg by mouth daily, Historical Med      metFORMIN (GLUCOPHAGE) 1000 MG tablet Take 1,000 mg by mouth, Starting Fri 3/12/2021, Until Sat 3/12/2022, Historical Med      aspirin 81 mg chewable tablet Chew 81 mg daily, Historical Med      diphenhydrAMINE (BENADRYL) 25 mg tablet Take 1 tablet (25 mg total) by mouth every 6 (six) hours as needed for itching, Starting Sat 3/6/2021, Print      mupirocin (BACTROBAN) 2 % ointment Apply topically 3 (three) times a day, Starting Sun 1/17/2021, Normal      polymyxin b-trimethoprim (POLYTRIM) ophthalmic solution Administer 1 drop to both eyes every 4 (four) hours, Starting Sun 1/17/2021, Normal         STOP taking these medications       nitrofurantoin (MACROBID) 100 mg capsule Comments:   Reason for Stopping:         predniSONE 10 mg tablet Comments:   Reason for Stopping:             Outpatient Discharge Orders   Discharge Diet     Activity as tolerated       PDMP Review     None           ED Provider  Attending physically available and evaluated Phillip Medina I managed the patient along with the ED Attending      Electronically Signed by         Tomas Arteaga MD  04/21/21 0013

## 2021-04-20 VITALS
HEIGHT: 66 IN | RESPIRATION RATE: 16 BRPM | SYSTOLIC BLOOD PRESSURE: 174 MMHG | TEMPERATURE: 98 F | BODY MASS INDEX: 18.18 KG/M2 | HEART RATE: 66 BPM | WEIGHT: 113.1 LBS | OXYGEN SATURATION: 99 % | DIASTOLIC BLOOD PRESSURE: 74 MMHG

## 2021-04-20 PROBLEM — Z86.73 HISTORY OF STROKE: Status: ACTIVE | Noted: 2021-04-20

## 2021-04-20 LAB
ALBUMIN SERPL BCP-MCNC: 3.5 G/DL (ref 3.5–5)
ALP SERPL-CCNC: 74 U/L (ref 46–116)
ALT SERPL W P-5'-P-CCNC: 16 U/L (ref 12–78)
ANION GAP SERPL CALCULATED.3IONS-SCNC: 8 MMOL/L (ref 4–13)
AST SERPL W P-5'-P-CCNC: 22 U/L (ref 5–45)
ATRIAL RATE: 67 BPM
ATRIAL RATE: 68 BPM
BACTERIA UR QL AUTO: ABNORMAL /HPF
BASOPHILS # BLD AUTO: 0.07 THOUSANDS/ΜL (ref 0–0.1)
BASOPHILS NFR BLD AUTO: 1 % (ref 0–1)
BILIRUB SERPL-MCNC: 0.27 MG/DL (ref 0.2–1)
BILIRUB UR QL STRIP: NEGATIVE
BUN SERPL-MCNC: 12 MG/DL (ref 5–25)
CALCIUM SERPL-MCNC: 8.6 MG/DL (ref 8.3–10.1)
CHLORIDE SERPL-SCNC: 103 MMOL/L (ref 100–108)
CLARITY UR: CLEAR
CO2 SERPL-SCNC: 31 MMOL/L (ref 21–32)
COLOR UR: YELLOW
CREAT SERPL-MCNC: 0.78 MG/DL (ref 0.6–1.3)
EOSINOPHIL # BLD AUTO: 0.28 THOUSAND/ΜL (ref 0–0.61)
EOSINOPHIL NFR BLD AUTO: 3 % (ref 0–6)
ERYTHROCYTE [DISTWIDTH] IN BLOOD BY AUTOMATED COUNT: 12.4 % (ref 11.6–15.1)
GFR SERPL CREATININE-BSD FRML MDRD: 77 ML/MIN/1.73SQ M
GLUCOSE SERPL-MCNC: 114 MG/DL (ref 65–140)
GLUCOSE SERPL-MCNC: 92 MG/DL (ref 65–140)
GLUCOSE SERPL-MCNC: 92 MG/DL (ref 65–140)
GLUCOSE UR STRIP-MCNC: NEGATIVE MG/DL
HCT VFR BLD AUTO: 41 % (ref 34.8–46.1)
HGB BLD-MCNC: 13.4 G/DL (ref 11.5–15.4)
HGB UR QL STRIP.AUTO: NEGATIVE
IMM GRANULOCYTES # BLD AUTO: 0.05 THOUSAND/UL (ref 0–0.2)
IMM GRANULOCYTES NFR BLD AUTO: 1 % (ref 0–2)
KETONES UR STRIP-MCNC: NEGATIVE MG/DL
LEUKOCYTE ESTERASE UR QL STRIP: NEGATIVE
LYMPHOCYTES # BLD AUTO: 3.65 THOUSANDS/ΜL (ref 0.6–4.47)
LYMPHOCYTES NFR BLD AUTO: 34 % (ref 14–44)
MCH RBC QN AUTO: 30.2 PG (ref 26.8–34.3)
MCHC RBC AUTO-ENTMCNC: 32.7 G/DL (ref 31.4–37.4)
MCV RBC AUTO: 92 FL (ref 82–98)
MONOCYTES # BLD AUTO: 0.7 THOUSAND/ΜL (ref 0.17–1.22)
MONOCYTES NFR BLD AUTO: 7 % (ref 4–12)
NEUTROPHILS # BLD AUTO: 5.94 THOUSANDS/ΜL (ref 1.85–7.62)
NEUTS SEG NFR BLD AUTO: 54 % (ref 43–75)
NITRITE UR QL STRIP: NEGATIVE
NON-SQ EPI CELLS URNS QL MICRO: ABNORMAL /HPF
NRBC BLD AUTO-RTO: 0 /100 WBCS
OTHER STN SPEC: ABNORMAL
P AXIS: 65 DEGREES
P AXIS: 67 DEGREES
PH UR STRIP.AUTO: 5 [PH]
PLATELET # BLD AUTO: 289 THOUSANDS/UL (ref 149–390)
PMV BLD AUTO: 10.6 FL (ref 8.9–12.7)
POTASSIUM SERPL-SCNC: 3.2 MMOL/L (ref 3.5–5.3)
PR INTERVAL: 176 MS
PR INTERVAL: 180 MS
PROT SERPL-MCNC: 7.1 G/DL (ref 6.4–8.2)
PROT UR STRIP-MCNC: NEGATIVE MG/DL
QRS AXIS: 57 DEGREES
QRS AXIS: 69 DEGREES
QRSD INTERVAL: 80 MS
QRSD INTERVAL: 80 MS
QT INTERVAL: 412 MS
QT INTERVAL: 412 MS
QTC INTERVAL: 435 MS
QTC INTERVAL: 438 MS
RBC # BLD AUTO: 4.44 MILLION/UL (ref 3.81–5.12)
RBC #/AREA URNS AUTO: ABNORMAL /HPF
SODIUM SERPL-SCNC: 142 MMOL/L (ref 136–145)
SP GR UR STRIP.AUTO: 1.01 (ref 1–1.03)
T WAVE AXIS: 24 DEGREES
T WAVE AXIS: 35 DEGREES
TROPONIN I SERPL-MCNC: <0.02 NG/ML
TROPONIN I SERPL-MCNC: <0.02 NG/ML
UROBILINOGEN UR QL STRIP.AUTO: 0.2 E.U./DL
VENTRICULAR RATE: 67 BPM
VENTRICULAR RATE: 68 BPM
WBC # BLD AUTO: 10.69 THOUSAND/UL (ref 4.31–10.16)
WBC #/AREA URNS AUTO: ABNORMAL /HPF

## 2021-04-20 PROCEDURE — 85025 COMPLETE CBC W/AUTO DIFF WBC: CPT | Performed by: INTERNAL MEDICINE

## 2021-04-20 PROCEDURE — 99239 HOSP IP/OBS DSCHRG MGMT >30: CPT | Performed by: INTERNAL MEDICINE

## 2021-04-20 PROCEDURE — 84484 ASSAY OF TROPONIN QUANT: CPT | Performed by: INTERNAL MEDICINE

## 2021-04-20 PROCEDURE — 82948 REAGENT STRIP/BLOOD GLUCOSE: CPT

## 2021-04-20 PROCEDURE — 36415 COLL VENOUS BLD VENIPUNCTURE: CPT | Performed by: INTERNAL MEDICINE

## 2021-04-20 PROCEDURE — 93010 ELECTROCARDIOGRAM REPORT: CPT | Performed by: INTERNAL MEDICINE

## 2021-04-20 PROCEDURE — 80053 COMPREHEN METABOLIC PANEL: CPT | Performed by: INTERNAL MEDICINE

## 2021-04-20 PROCEDURE — 81001 URINALYSIS AUTO W/SCOPE: CPT | Performed by: INTERNAL MEDICINE

## 2021-04-20 PROCEDURE — 93005 ELECTROCARDIOGRAM TRACING: CPT

## 2021-04-20 RX ORDER — AMLODIPINE BESYLATE 10 MG/1
10 TABLET ORAL DAILY
Qty: 30 TABLET | Refills: 0 | Status: SHIPPED | OUTPATIENT
Start: 2021-04-20 | End: 2021-04-20 | Stop reason: SDUPTHER

## 2021-04-20 RX ORDER — LISINOPRIL 20 MG/1
20 TABLET ORAL DAILY
Qty: 30 TABLET | Refills: 0 | Status: SHIPPED | OUTPATIENT
Start: 2021-04-20 | End: 2021-06-21 | Stop reason: SDUPTHER

## 2021-04-20 RX ORDER — ATORVASTATIN CALCIUM 20 MG/1
20 TABLET, FILM COATED ORAL DAILY
COMMUNITY

## 2021-04-20 RX ORDER — AMLODIPINE BESYLATE 10 MG/1
10 TABLET ORAL DAILY
Qty: 30 TABLET | Refills: 0 | Status: CANCELLED | OUTPATIENT
Start: 2021-04-20

## 2021-04-20 RX ORDER — AMLODIPINE BESYLATE 10 MG/1
10 TABLET ORAL DAILY
Qty: 30 TABLET | Refills: 0 | Status: SHIPPED | OUTPATIENT
Start: 2021-04-20

## 2021-04-20 RX ORDER — LISINOPRIL 20 MG/1
20 TABLET ORAL DAILY
Qty: 30 TABLET | Refills: 0 | Status: CANCELLED | OUTPATIENT
Start: 2021-04-20

## 2021-04-20 RX ORDER — POTASSIUM CHLORIDE 20 MEQ/1
40 TABLET, EXTENDED RELEASE ORAL ONCE
Status: COMPLETED | OUTPATIENT
Start: 2021-04-20 | End: 2021-04-20

## 2021-04-20 RX ORDER — ASPIRIN 81 MG/1
81 TABLET, CHEWABLE ORAL DAILY
COMMUNITY

## 2021-04-20 RX ORDER — AMLODIPINE BESYLATE 10 MG/1
10 TABLET ORAL DAILY
COMMUNITY
End: 2021-04-20 | Stop reason: SDUPTHER

## 2021-04-20 RX ORDER — LISINOPRIL 20 MG/1
20 TABLET ORAL DAILY
Qty: 30 TABLET | Refills: 0 | Status: SHIPPED | OUTPATIENT
Start: 2021-04-20 | End: 2021-04-20 | Stop reason: SDUPTHER

## 2021-04-20 RX ADMIN — POTASSIUM CHLORIDE 40 MEQ: 1500 TABLET, EXTENDED RELEASE ORAL at 11:39

## 2021-04-20 RX ADMIN — LISINOPRIL 20 MG: 20 TABLET ORAL at 09:11

## 2021-04-20 NOTE — PLAN OF CARE
Problem: PAIN - ADULT  Goal: Verbalizes/displays adequate comfort level or baseline comfort level  Description: Interventions:  - Encourage patient to monitor pain and request assistance  - Assess pain using appropriate pain scale  - Administer analgesics based on type and severity of pain and evaluate response  - Implement non-pharmacological measures as appropriate and evaluate response  - Consider cultural and social influences on pain and pain management  - Notify physician/advanced practitioner if interventions unsuccessful or patient reports new pain  Outcome: Progressing     Problem: INFECTION - ADULT  Goal: Absence or prevention of progression during hospitalization  Description: INTERVENTIONS:  - Assess and monitor for signs and symptoms of infection  - Monitor lab/diagnostic results  - Monitor all insertion sites, i e  indwelling lines, tubes, and drains  - Monitor endotracheal if appropriate and nasal secretions for changes in amount and color  - [place] appropriate cooling/warming therapies per order  - Administer medications as ordered  - Instruct and encourage patient and family to use good hand hygiene technique  - Identify and instruct in appropriate isolation precautions for identified infection/condition  Outcome: Progressing  Goal: Absence of fever/infection during neutropenic period  Description: INTERVENTIONS:  - Monitor WBC    Outcome: Progressing     Problem: SAFETY ADULT  Goal: Patient will remain free of falls  Description: INTERVENTIONS:  - Assess patient frequently for physical needs  -  Identify cognitive and physical deficits and behaviors that affect risk of falls    -  [place] fall precautions as indicated by assessment   - Educate patient/family on patient safety including physical limitations  - Instruct patient to call for assistance with activity based on assessment  - Modify environment to reduce risk of injury  - Consider OT/PT consult to assist with strengthening/mobility  Outcome: Progressing  Goal: Maintain or return to baseline ADL function  Description: INTERVENTIONS:  -  Assess patient's ability to carry out ADLs; assess patient's baseline for ADL function and identify physical deficits which impact ability to perform ADLs (bathing, care of mouth/teeth, toileting, grooming, dressing, etc )  - Assess/evaluate cause of self-care deficits   - Assess range of motion  - Assess patient's mobility; develop plan if impaired  - Assess patient's need for assistive devices and provide as appropriate  - Encourage maximum independence but intervene and supervise when necessary  - Involve family in performance of ADLs  - Assess for home care needs following discharge   - Consider OT consult to assist with ADL evaluation and planning for discharge  - Provide patient education as appropriate  Outcome: Progressing  Goal: Maintain or return mobility status to optimal level  Description: INTERVENTIONS:  - Assess patient's baseline mobility status (ambulation, transfers, stairs, etc )    - Identify cognitive and physical deficits and behaviors that affect mobility  - Identify mobility aids required to assist with transfers and/or ambulation (gait belt, sit-to-stand, lift, walker, cane, etc )  - [place] fall precautions as indicated by assessment  - Record patient progress and toleration of activity level on Mobility SBAR; progress patient to next Phase/Stage  - Instruct patient to call for assistance with activity based on assessment  - Consider rehabilitation consult to assist with strengthening/weightbearing, etc   Outcome: Progressing     Problem: DISCHARGE PLANNING  Goal: Discharge to home or other facility with appropriate resources  Description: INTERVENTIONS:  - Identify barriers to discharge w/patient and caregiver  - Arrange for needed discharge resources and transportation as appropriate  - Identify discharge learning needs (meds, wound care, etc )  - Arrange for interpretive services to assist at discharge as needed  - Refer to Case Management Department for coordinating discharge planning if the patient needs post-hospital services based on physician/advanced practitioner order or complex needs related to functional status, cognitive ability, or social support system  Outcome: Progressing     Problem: Knowledge Deficit  Goal: Patient/family/caregiver demonstrates understanding of disease process, treatment plan, medications, and discharge instructions  Description: Complete learning assessment and assess knowledge base    Interventions:  - Provide teaching at level of understanding  - Provide teaching via preferred learning methods  Outcome: Progressing

## 2021-04-20 NOTE — ASSESSMENT & PLAN NOTE
· Possibly secondary to uncontrolled blood pressure  · CTA head and neck negative for acute intracranial hemorrhage, old right thalamic infarct and stable left frontal calcified 1 cm meningioma  · Currently denies any headaches

## 2021-04-20 NOTE — DISCHARGE SUMMARY
[address] [facility]  Discharge- Marisela Fuller 1950, 79 y o  female MRN: [id number]  Unit/Bed#: ED 30 Encounter: [id number]  Primary Care Provider: No primary care provider on file  Date and time admitted to hospital: 4/19/2021  4:35 PM    History of stroke  Assessment & Plan  Prior history of stroke as per the CT scan findings  Patient does not have any neuro deficit  Will continue aspirin and statin  Discussed with the patient about continuing risk factor control including good blood pressure, blood sugar control  Counseled patient to be compliant with her medications as well as follow-up appointment with her family physician    Headache  Assessment & Plan  · Possibly secondary to uncontrolled blood pressure  · CTA head and neck negative for acute intracranial hemorrhage, old right thalamic infarct and stable left frontal calcified 1 cm meningioma  · Currently denies any headaches    Diabetes mellitus ([facility] [address] )  Assessment & Plan  No results found for: HGBA1C    Recent Labs     04/20/21  0749   POCGLU 92         · Last hemoglobin A1c 11 9 on 3/12/21 at [facility]    It appears patient is on Trulicity injections, Januvia  Discussed with the patient about blood sugar control and close follow-up with PCP  Last office visit on 03/22 noted  Counseled on good blood sugar control  (P) 92    Hypertensive urgency  Assessment & Plan  · Systolic blood pressure in 190  · Patient on amlodipine and lisinopril prior to admission, will continue home medications  · Counseled patient on blood pressure control and follow-up with PCP      * Generalized weakness  Assessment & Plan  This is a 72-year-old female with history of hypertension, diabetes mellitus presented with generalized weakness, body aches and bladder discomfort  Has been having the symptoms for the past 1 week  Was initially seen in the ED on 04/13/2021 at which point patient had left arm numbness and weakness for 3 days    Patient also had urinary frequency and suprapubic tenderness  She underwent CT head which was negative and CT abdomen pelvis which were without any acute findings  Urinalysis revealed acute cystitis and patient was discharged home on Macrobid  Returning now with suprapubic pain and generalized weakness  · Unclear etiology of patient's symptoms  · Patient continues to report all her symptoms have resolved  · She is ambulating in the room with no difficulty  Denies any dizziness or weakness  No paresthesias  No focal deficits on exam    · Urinalysis on admission insignificant for UTI  · COVID negative    Her symptoms likely secondary to hypertensive urgency, counseled on good blood pressure control  Also counseled on good blood sugar control    Discharging Physician / Practitioner: Jonnie Garcia MD  PCP: No primary care provider on file  Admission Date:   Admission Orders (From admission, onward)     Ordered        04/19/21 2059  Inpatient Admission  Once                   Discharge Date: 04/20/21    Resolved Problems  Date Reviewed: 4/20/2021    None          Consultations During Hospital Stay:  · None    Procedures Performed:   · None    Significant Findings / Test Results:   · CTA of head and neck  IMPRESSION:     1  No Evidence of acute intracranial hemorrhage  2  No evidence of hemodynamic significant stenosis, aneurysm or dissection  3  Old right thalamic infarct and stable left frontal calcified 1 cm meningioma      Chest x-ray  No acute abnormality  Incidental Findings:   · None    Test Results Pending at Discharge (will require follow up):    · None     Outpatient Tests Requested:  · None    Complications:  None    Reason for Admission:  Headache, generalized weakness    Hospital Course:     Juarez Horn is a 79 y o  female patient primarily [place]-speaking with past medical history of type 2 diabetes, hypertension, prior history of stroke concern for noncompliance who originally presented to the hospital on 4/19/2021 due to generalized weakness body aches  She was recently in emergency department for UTI, was discharged on [address], reportedly completed antibiotic course  Her blood pressure was elevated upon admission  Patient's symptoms are likely secondary to hypertensive urgency, her symptoms completely resolved with control of blood pressure  Patient counseled on importance of taking her medications  Recommended to follow-up with her PCP for further management of her medical problems  She is clinically hemodynamically stable for discharge  Recommend outpatient follow-up    Please see above list of diagnoses and related plan for additional information  Condition at Discharge: stable     Discharge Day Visit / Exam:     Subjective:  Patient seen and examined  He is primarily [place]-speaking, discussed with the help of  phone  She feels well today  Denies any headache or chest pain abdominal pain  No suprapubic tenderness  She is able to ambulate and difficulty  No weakness or paresthesias  Vitals: Blood Pressure: 154/90 (04/20/21 0745)  Pulse: 71 (04/20/21 0745)  Temperature: 98 2 °F (36 8 °C) (04/20/21 0745)  Temp Source: Oral (04/20/21 0745)  Respirations: 16 (04/20/21 0745)  Height: 5' 6" (167 6 cm) (04/19/21 2222)  Weight - Scale: 51 3 kg (113 lb 1 5 oz) (04/19/21 2222)  SpO2: 98 % (04/20/21 0745)  Exam:   Physical Exam  Vitals signs and nursing note reviewed  Constitutional:       Appearance: Normal appearance  HENT:      Head: Normocephalic and atraumatic  Nose: Nose normal    Neck:      Musculoskeletal: Normal range of motion  Cardiovascular:      Rate and Rhythm: Normal rate and regular rhythm  Pulmonary:      Effort: Pulmonary effort is normal  No respiratory distress  Breath sounds: Normal breath sounds  Abdominal:      General: Abdomen is flat  Palpations: Abdomen is soft  Musculoskeletal: Normal range of motion     Skin:     General: Skin is warm  Neurological:      General: No focal deficit present  Mental Status: She is alert and oriented to person, place, and time  Discussion with Family:     Discharge instructions/Information to patient and family:   See after visit summary for information provided to patient and family  Provisions for Follow-Up Care:  See after visit summary for information related to follow-up care and any pertinent home health orders  Disposition:     Home        Planned Readmission:      Discharge Statement:  I spent 35 minutes discharging the patient  This time was spent on the day of discharge  I had direct contact with the patient on the day of discharge  Greater than 50% of the total time was spent examining patient, answering all patient questions, arranging and discussing plan of care with patient as well as directly providing post-discharge instructions  Additional time then spent on discharge activities  Discharge Medications:  See after visit summary for reconciled discharge medications provided to patient and family        ** Please Note: This note has been constructed using a voice recognition system **

## 2021-04-20 NOTE — H&P
[address] [facility]  H&P- Kelly  [address] 1950, 79 y o  female MRN: [id number]  Unit/Bed#: ED 06 Encounter: [id number]  Primary Care Provider: No primary care provider on file  Date and time admitted to hospital: 4/19/2021  4:35 PM    * Generalized weakness  Assessment & Plan  This is a 61-year-old female with history of hypertension, diabetes mellitus presented with generalized weakness, body aches and bladder discomfort  Has been having the symptoms for the past 1 week  Was initially seen in the ED on 04/13/2021 at which point patient had left arm numbness and weakness for 3 days  Patient also had urinary frequency and suprapubic tenderness  She underwent CT head which was negative and CT abdomen pelvis which were without any acute findings  Urinalysis revealed acute cystitis and patient was discharged home on Macrobid  Returning now with suprapubic pain and generalized weakness  · Unclear etiology of patient's symptoms, upon my examination patient denies any complaints  · Will repeat urinalysis  · COVID negative    Hypertensive urgency  Assessment & Plan  · Systolic blood pressure in 190  · Will continue home medication of lisinopril  · Hydralazine IV q 6 hours p r n  Headache  Assessment & Plan  · Possibly secondary to uncontrolled blood pressure  · CTA head and neck negative for acute intracranial hemorrhage, old right thalamic infarct and stable left frontal calcified 1 cm meningioma  · Currently denies any headaches    Diabetes mellitus ([facility] [address] )  Assessment & Plan  No results found for: HGBA1C    No results for input(s): POCGLU in the last 72 hours  · Cannot recall her medications states she gets weekly injections and is possibly on metformin? ?  · Will need to confirm home medications in a m    · Last hemoglobin A1c 11 9 on 3/12/21 at [facility]  · Will monitor Accu-Cheks, sliding scale for coverage  · Patient may need initiation of insulin          VTE Prophylaxis: low risk, early ambulation  / sequential compression device   Code Status: full  POLST: There is no POLST form on file for this patient (pre-hospital)    Anticipated Length of Stay:  Patient will be admitted on an Inpatient basis with an anticipated length of stay of  Greater than 2 midnights  Justification for Hospital Stay:  Generalized weakness, body aches    Total Time for Visit, including Counseling / Coordination of Care: 30 minutes  Greater than 50% of this total time spent on direct patient counseling and coordination of care  Chief Complaint:   Generalized weakness, body aches    History of Present Illness:    Hayder Duran is a 79 y o  female who presents with multiple complaints  Mosotho interpretation using iPad   Patient has history of diabetes mellitus not on any medications, hypertension  Initially presented in the ED on 2021 at which point patient had multiple complaints underwent workup including CT head and CT abdomen pelvis which was unremarkable she was diagnosed with UTI discharged home on Macrobid  Complaining now of suprapubic discomfort, generalized weakness and back pain  Also complained of having a generalize headaches  Denies any fever, chills,  Denies any chest pain, palpitations, dyspnea  Review of Systems:    Review of Systems   Constitutional: Positive for fatigue  HENT: Negative  Eyes: Negative  Respiratory: Negative  Cardiovascular: Negative  Gastrointestinal: Negative  Endocrine: Negative  Genitourinary: Positive for pelvic pain  Musculoskeletal: Negative  Skin: Negative  Allergic/Immunologic: Negative  Neurological: Positive for weakness  Hematological: Negative  Psychiatric/Behavioral: Negative          Past Medical and Surgical History:     Past Medical History:   Diagnosis Date    Diabetes mellitus ([facility] [address] )     Hypertension        Past Surgical History:   Procedure Laterality Date     SECTION Meds/Allergies:    Prior to Admission medications    Medication Sig Start Date End Date Taking? Authorizing Provider   acetaminophen (TYLENOL) 500 mg tablet Take 2 tablets (1,000 mg total) by mouth every 6 (six) hours as needed for mild pain 4/12/21   Perry Metz PA-C   diphenhydrAMINE (BENADRYL) 25 mg tablet Take 1 tablet (25 mg total) by mouth every 6 (six) hours as needed for itching 3/6/21   Roselyn Martinez PA-C   lisinopril (ZESTRIL) 20 mg tablet Take 1 tablet (20 mg total) by mouth daily 1/17/21   Perry Metz PA-C   mupirocin [facility]) 2 % ointment Apply topically 3 (three) times a day 1/17/21   Perry Metz PA-C   nitrofurantoin (MACROBID) 100 mg capsule Take 1 capsule (100 mg total) by mouth 2 (two) times a day 4/12/21   Perry Metz PA-C   polymyxin b-trimethoprim (POLYTRIM) ophthalmic solution Administer 1 drop to both eyes every 4 (four) hours 1/17/21   Perry Metz PA-C   predniSONE 10 mg tablet 5 tabs po qd x 2 days then 4 tabs po qd x 2 days then 3 tabs po qd x 2 days then 2 tabs po qd x 2 days then 1 tab po qd x 2 days 3/6/21   Roselyn Martinez PA-C     I have reviewed home medications with patient family member  Allergies: No Known Allergies    Social History:     Social History     Substance and Sexual Activity   Alcohol Use Never    Frequency: Never     Social History     Tobacco Use   Smoking Status Never Smoker   Smokeless Tobacco Never Used     Social History     Substance and Sexual Activity   Drug Use Never       Family History:    History reviewed  No pertinent family history      Physical Exam:     Vitals:   Blood Pressure: (!) 190/88 (04/19/21 2221)  Pulse: 60 (04/19/21 2221)  Temperature: 98 2 °F (36 8 °C) (04/19/21 1536)  Temp Source: Oral (04/19/21 1536)  Respirations: 20 (04/19/21 2221)  Height: 5' 6" (167 6 cm) (04/19/21 2222)  Weight - Scale: 51 3 kg (113 lb 1 5 oz) (04/19/21 2222)  SpO2: 97 % (04/19/21 2221)    Constitutional: Patient is oriented to person, place and time, no acute distress  HEENT:  Normocephalic, atraumatic  Cardiovascular: Normal S1S2, RRR, No murmurs/rubs/gallops appreciated  Pulmonary:  Bilateral air entry, No rhonchi/rales/wheezing appreciated  Abdominal: Soft, Bowel sounds present, Non-tender, Non-distended  Extremities:  No cyanosis, clubbing or edema  Neurological: Cranial nerves II-XII grossly intact, sensation intact, otherwise no focal neurological symptoms  Additional Data:     Lab Results: I have personally reviewed pertinent reports  Results from last 7 days   Lab Units 04/19/21  1720   WBC Thousand/uL 9 77   HEMOGLOBIN g/dL 14 2   HEMATOCRIT % 43 8   PLATELETS Thousands/uL 327   NEUTROS PCT % 61   LYMPHS PCT % 28   MONOS PCT % 8   EOS PCT % 2     Results from last 7 days   Lab Units 04/19/21  1720   POTASSIUM mmol/L 4 0   CHLORIDE mmol/L 101   CO2 mmol/L 30   BUN mg/dL 15   CREATININE mg/dL 0 92   CALCIUM mg/dL 9 5   ALK PHOS U/L 86   ALT U/L 22   AST U/L 40           Imaging: I have personally reviewed pertinent reports  Cta Head And Neck With And Without Contrast    Result Date: 4/19/2021  Narrative: CTA NECK AND BRAIN WITH AND WITHOUT CONTRAST INDICATION: blurry vision and numbness/tingling down left arm COMPARISON:   4/12/2021 TECHNIQUE:  Routine CT imaging of the Brain without contrast   Post contrast imaging was performed after administration of iodinated contrast through the neck and brain  Post contrast axial 0 625 mm images timed to opacify the arterial system  3D rendering was performed on an independent workstation  MIP reconstructions performed  Coronal reconstructions were performed of the noncontrast portion of the brain  Radiation dose length product (DLP) for this visit:  917 06 104 mGy-cm     This examination, like all CT scans performed in the [facility], was performed utilizing techniques to minimize radiation dose exposure, including the use of iterative reconstruction and automated exposure control  IV Contrast:  85 mL of iohexol (OMNIPAQUE)  IMAGE QUALITY:   Diagnostic FINDINGS: NONCONTRAST BRAIN PARENCHYMA: Decreased attenuation is noted in periventricular and subcortical white matter demonstrating an appearance that is statistically most likely to represent mild microangiopathic change  Old right thalamic infarct  Left frontal calcified 1 cm lesion most likely meningioma extending from the left sphenoid bone  No acute parenchymal hemorrhage  VENTRICLES AND EXTRA-AXIAL SPACES:  Normal for the patient's age  VISUALIZED ORBITS AND PARANASAL SINUSES:  Unremarkable  CERVICAL VASCULATURE AORTIC ARCH AND GREAT VESSELS:  Normal aortic arch and great vessel origins  Normal visualized subclavian vessels  RIGHT VERTEBRAL ARTERY CERVICAL SEGMENT:  Normal origin  The vessel is normal in caliber throughout the neck  LEFT VERTEBRAL ARTERY CERVICAL SEGMENT:  Normal origin  The vessel is normal in caliber throughout the neck  RIGHT EXTRACRANIAL CAROTID SEGMENT:  Normal caliber common carotid artery  Normal bifurcation and cervical internal carotid artery  No stenosis or dissection  LEFT EXTRACRANIAL CAROTID SEGMENT:  Normal caliber common carotid artery  Normal bifurcation and cervical internal carotid artery  No stenosis or dissection  NASCET criteria was used to determine the degree of internal carotid artery diameter stenosis  INTRACRANIAL VASCULATURE INTERNAL CAROTID ARTERIES:  Normal enhancement of the intracranial portions of the internal carotid arteries  Normal ophthalmic artery origins  Normal ICA terminus  ANTERIOR CIRCULATION:  Symmetric A1 segments and anterior cerebral arteries with normal enhancement  Normal anterior communicating artery  MIDDLE CEREBRAL ARTERY CIRCULATION:  M1 segment and middle cerebral artery branches demonstrate normal enhancement bilaterally  DISTAL VERTEBRAL ARTERIES:  Normal distal vertebral arteries    Posterior inferior cerebellar artery origins are normal  Normal vertebral basilar junction  BASILAR ARTERY:  Basilar artery is normal in caliber  Normal superior cerebellar arteries  POSTERIOR CEREBRAL ARTERIES: Both posterior cerebral arteries arises from the basilar tip  Both arteries demonstrate normal enhancement  Normal posterior communicating arteries  DURAL VENOUS SINUSES:  Normal  NON VASCULAR ANATOMY BONY STRUCTURES:  No acute osseous abnormality  SOFT TISSUES OF THE NECK:  Normal  THORACIC INLET:  Unremarkable  Impression: 1  No Evidence of acute intracranial hemorrhage  2  No evidence of hemodynamic significant stenosis, aneurysm or dissection  3  Old right thalamic infarct and stable left frontal calcified 1 cm meningioma  Workstation performed: [id number]     Ct Head Without Contrast    Result Date: 4/12/2021  Narrative: CT BRAIN - WITHOUT CONTRAST INDICATION:   acute vision changes  COMPARISON:  None  TECHNIQUE:  CT examination of the brain was performed  In addition to axial images, sagittal and coronal 2D reformatted images were created and submitted for interpretation  Radiation dose length product (DLP) for this visit:  761 mGy-cm   This examination, like all CT scans performed in the [facility], was performed utilizing techniques to minimize radiation dose exposure, including the use of iterative reconstruction and automated exposure control  IMAGE QUALITY:  Diagnostic  FINDINGS: PARENCHYMA: Decreased attenuation is noted in periventricular and subcortical white matter demonstrating an appearance that is statistically most likely to represent mild microangiopathic change  No intracranial mass, mass effect or midline shift  No acute parenchymal hemorrhage  Small chronic lacunar infarct in the right anterior thalamic region  VENTRICLES AND EXTRA-AXIAL SPACES:  Normal for the patient's age  VISUALIZED ORBITS AND PARANASAL SINUSES:  Unremarkable   CALVARIUM AND EXTRACRANIAL SOFT TISSUES:  Normal  Impression: No acute intracranial hemorrhage, midline shift, or mass effect  Workstation performed: [id number]     Ct Abdomen Pelvis With Contrast    Result Date: 4/12/2021  Narrative: CT ABDOMEN AND PELVIS WITH IV CONTRAST INDICATION:   Abdominal pain, acute, nonlocalized flank pain radiates to umbilical region x 3 days  COMPARISON:  None  TECHNIQUE:  CT examination of the abdomen and pelvis was performed  Axial, sagittal, and coronal 2D reformatted images were created from the source data and submitted for interpretation  Radiation dose length product (DLP) for this visit:  275 mGy-cm   This examination, like all CT scans performed in the [facility], was performed utilizing techniques to minimize radiation dose exposure, including the use of iterative reconstruction and automated exposure control  IV Contrast:  100 mL of iohexol (OMNIPAQUE)  was administered intravenously without immediate adverse reaction  Enteric Contrast:  Enteric contrast was not administered  FINDINGS: ABDOMEN LOWER CHEST:  No clinically significant abnormality identified in the visualized lower chest  LIVER/BILIARY TREE:  Unremarkable  GALLBLADDER:  No calcified gallstones  No pericholecystic inflammatory change  SPLEEN:  Unremarkable  PANCREAS:  Unremarkable  ADRENAL GLANDS:  Unremarkable  KIDNEYS/URETERS:  Symmetric enhancement of the kidneys  No significant calculus  No significant hydronephrosis  STOMACH AND BOWEL:  No bowel obstruction  Diverticulosis of the colon without evidence of diverticulitis  APPENDIX:  A normal appendix was visualized  ABDOMINOPELVIC CAVITY:  No ascites  No pneumoperitoneum  No lymphadenopathy  VESSELS:  Unremarkable for patient's age  PELVIS REPRODUCTIVE ORGANS:  Unremarkable for patient's age  URINARY BLADDER:  Unremarkable  ABDOMINAL WALL/INGUINAL REGIONS:  Unremarkable  OSSEOUS STRUCTURES:  No acute fracture or destructive osseous lesion    Degenerative changes of the osseous structures  Endplate sclerosis at C3-Y7  Impression: No acute inflammatory process identified within the abdomen or pelvis  Workstation performed: [id number]       EKG, Pathology, and Other Studies Reviewed on Admission:   · EKG:  Sinus rhythm without any acute ST changes    Allscripts / Epic Records Reviewed: Yes     ** Please Note: This note has been constructed using a voice recognition system   **

## 2021-04-20 NOTE — ASSESSMENT & PLAN NOTE
· Systolic blood pressure in 190  · Will continue home medication of lisinopril  · Hydralazine IV q 6 hours p r n

## 2021-04-20 NOTE — UTILIZATION REVIEW
Initial Clinical Review    Admission: Date/Time/Statement:   Admission Orders (From admission, onward)     Ordered        04/19/21 2059  Inpatient Admission  Once                   Orders Placed This Encounter   Procedures    Inpatient Admission     Standing Status:   Standing     Number of Occurrences:   1     Order Specific Question:   Level of Care     Answer:   Med Surg [16]     Order Specific Question:   Estimated length of stay     Answer:   More than 2 Midnights     Order Specific Question:   Certification     Answer:   I certify that inpatient services are medically necessary for this patient for a duration of greater than two midnights  See H&P and MD Progress Notes for additional information about the patient's course of treatment  ED Arrival Information     Expected Arrival Acuity Means of Arrival Escorted By Service Admission Type    - 4/19/2021 15:17 Urgent Walk-In Family Member Hospitalist Urgent    Arrival Complaint    Chest Pain, weakness        Chief Complaint   Patient presents with    Weakness - Generalized     Generalized weakness, fatigue, body aches, headache  No known fever  Initial Presentation:  80 yo female with Hx of HTN, DM presents to ED from home with persistent viral symptoms and bladder discomfort -generalized weakness, malaise, myalgias particularly in her legs and low back  Patient  evaluated for very similar symptoms approximately 1 week ago & reports resolution of some of the symptoms but persistence of others  -primarily  weakness and myalgias  She was diagnosed with UTI discharged home on Macrobid  Patient reports not taking medications at home  Also noted chest pain earlier today that resolved spontaneously  Patient reports bilateral blurriness and left arm numbness tingling ongoing the last 5-7 days  On exam: CN 2-12 intact  Strength 5/5 throughout  Sensation grossly intact  Cerebellar exam including gait intact  + wheezing  CXR negative    CTA head and neck no acute process  COVID-19 negative  Date:  4/19  Admitted to Inpatient with Weakness, Hypertensive Urgency, Headache and plan is for telemetry, monitor bp, continue lisinopril, prn hydralazine, repeat urine, accu checks with ssi  Day 2: Pt reports all symptoms resolved, ambulating in the room w/o  difficulty  Denies any dizziness or weakness  No paresthesias  No focal deficits on exam   Her symptoms likely secondary to hypertensive urgency, counseled on good blood pressure control  Also counseled on good blood sugar control  Written for discharge       ED Triage Vitals [04/19/21 1536]   Temperature Pulse Respirations Blood Pressure SpO2   98 2 °F (36 8 °C) 85 18 143/91 98 %      Temp Source Heart Rate Source Patient Position - Orthostatic VS BP Location FiO2 (%)   Oral Monitor Lying Right arm --      Pain Score       Worst Possible Pain          Wt Readings from Last 1 Encounters:   04/19/21 51 3 kg (113 lb 1 5 oz)     Additional Vital Signs:   04/20/21 0745  98 2 °F (36 8 °C)  71  16  154/90  --  98 %  None (Room air) Lying   04/20/21 0301  --  64  16  148/74  --  98 %  None (Room air) Lying   04/20/21 0030  --  70  14  159/74  107  97 %  None (Room air) Lying   04/19/21 2349  --  60  19  178/85Abnormal   --  98 %  None (Room air) Lying   04/19/21 2221  --  60  20  190/88Abnormal   --  97 %  None (Room air) Lying   04/19/21 2037  --  --  --  178/84Abnormal   --  --  -- --   04/19/21 2000  --  74  22  192/81Abnormal   --  100 %  None (Room air) Sitting   04/19/21 1759  --  66  14  177/86Abnormal   123  98 %  None (Room air) --       Pertinent Labs/Diagnostic Test Results:   Results from last 7 days   Lab Units 04/19/21  1720   SARS-COV-2  Negative     Results from last 7 days   Lab Units 04/20/21  0437 04/19/21  1720   WBC Thousand/uL 10 69* 9 77   HEMOGLOBIN g/dL 13 4 14 2   HEMATOCRIT % 41 0 43 8   PLATELETS Thousands/uL 289 327   NEUTROS ABS Thousands/µL 5 94 5 98     Results from last 7 days   Lab Units 04/20/21  0437 04/19/21  1720   SODIUM mmol/L 142 140   POTASSIUM mmol/L 3 2* 4 0   CHLORIDE mmol/L 103 101   CO2 mmol/L 31 30   ANION GAP mmol/L 8 9   BUN mg/dL 12 15   CREATININE mg/dL 0 78 0 92   EGFR ml/min/1 73sq m 77 63   CALCIUM mg/dL 8 6 9 5     Results from last 7 days   Lab Units 04/20/21  0437 04/19/21  1720   AST U/L 22 40   ALT U/L 16 22   ALK PHOS U/L 74 86   TOTAL PROTEIN g/dL 7 1 8 0   ALBUMIN g/dL 3 5 3 8   TOTAL BILIRUBIN mg/dL 0 27 0 43     Results from last 7 days   Lab Units 04/20/21  0749   POC GLUCOSE mg/dl 92     Results from last 7 days   Lab Units 04/20/21  0437 04/19/21  1720   GLUCOSE RANDOM mg/dL 92 103     Results from last 7 days   Lab Units 04/20/21  0258 04/19/21  2357 04/19/21  2053 04/19/21  1720   TROPONIN I ng/mL <0 02 <0 02 <0 02 <0 02     Results from last 7 days   Lab Units 04/19/21  1720   LIPASE u/L 164     Results from last 7 days   Lab Units 04/20/21  0131   CLARITY UA  Clear   COLOR UA  Yellow   SPEC GRAV UA  1 010   PH UA  5 0   GLUCOSE UA mg/dl Negative   KETONES UA mg/dl Negative   BLOOD UA  Negative   PROTEIN UA mg/dl Negative   NITRITE UA  Negative   BILIRUBIN UA  Negative   UROBILINOGEN UA E U /dl 0 2   LEUKOCYTES UA  Negative   WBC UA /hpf 1-2*   RBC UA /hpf 0-1*   BACTERIA UA /hpf Occasional   EPITHELIAL CELLS WET PREP /hpf Occasional     Results from last 7 days   Lab Units 04/19/21  1720   INFLUENZA A PCR  Negative   INFLUENZA B PCR  Negative   RSV PCR  Negative     4/19 CTA Head & Neck:1  No Evidence of acute intracranial hemorrhage  2  No evidence of hemodynamic significant stenosis, aneurysm or dissection  3  Old right thalamic infarct and stable left frontal calcified 1 cm meningioma        4/19 CXR:  No acute cardiopulmonary disease  4/19 EKG:  Normal sinus rhythm  Nonspecific ST abnormality    ED Treatment:   Medication Administration from 04/19/2021 1516 to 04/20/2021 1046       Date/Time Order Dose Route Action     04/19/2021 1720 sodium chloride 0 9 % bolus 1,000 mL 1,000 mL Intravenous New Bag     04/19/2021 1720 ondansetron (ZOFRAN) injection 4 mg 4 mg Intravenous Given     04/19/2021 1720 acetaminophen (TYLENOL) tablet 975 mg 975 mg Oral Given     04/19/2021 1720 ketorolac (TORADOL) injection 15 mg 15 mg Intravenous Given     04/20/2021 0911 lisinopril (ZESTRIL) tablet 20 mg 20 mg Oral Given     04/19/2021 2350 hydrALAZINE (APRESOLINE) injection 5 mg 5 mg Intravenous Given        Past Medical History:   Diagnosis Date    Diabetes mellitus ([facility] [address] )     Hypertension      Admitting Diagnosis: Weakness [R53 1]  Age/Sex: 79 y o  female     Admission Orders:  Scheduled Medications:  insulin lispro, 1-5 Units, Subcutaneous, TID AC  lisinopril, 20 mg, Oral, Daily  potassium chloride, 40 mEq, Oral, Once  4/20    Continuous IV Infusions:     PRN Meds:  acetaminophen, 650 mg, Oral, Q6H PRN  hydrALAZINE, 5 mg, Intravenous, Q6H PRN    Telemetry      Network Utilization Review Department  ATTENTION: Please call with any questions or concerns to [phone] and carefully listen to the prompts so that you are directed to the right person  All voicemails are confidential   Katina Jones all requests for admission clinical reviews, approved or denied determinations and any other requests to dedicated fax number below belonging to the campus where the patient is receiving treatment   List of dedicated fax numbers for the Facilities:  [address] DENIALS (Administrative/Medical Necessity) [phone]   [address] (Maternity/NICU/Pediatrics) 261 [facility] [place] [address] [id number] [address] 1924 [facility] [address] [place] 1481 [phone] [facility] 951 [phone]

## 2021-04-20 NOTE — ASSESSMENT & PLAN NOTE
Prior history of stroke as per the CT scan findings  Patient does not have any neuro deficit  Will continue aspirin and statin  Discussed with the patient about continuing risk factor control including good blood pressure, blood sugar control    Counseled patient to be compliant with her medications as well as follow-up appointment with her family physician

## 2021-04-20 NOTE — ED NOTES
Daughter Agueda Cantrell) made aware of patient's negative covid results  Daughter requesting to be notified of patient's room number when assigned bed        Jossy Hay RN  04/19/21 3827

## 2021-04-20 NOTE — ASSESSMENT & PLAN NOTE
· Systolic blood pressure in 190  · Patient on amlodipine and lisinopril prior to admission, will continue home medications  · Counseled patient on blood pressure control and follow-up with PCP

## 2021-04-20 NOTE — ASSESSMENT & PLAN NOTE
No results found for: HGBA1C    Recent Labs     04/20/21  0749   POCGLU 92         · Last hemoglobin A1c 11 9 on 3/12/21 at [facility]    It appears patient is on Trulicity injections, Januvia  Discussed with the patient about blood sugar control and close follow-up with PCP  Last office visit on 03/22 noted    Counseled on good blood sugar control  (P) 92

## 2021-04-20 NOTE — ED NOTES
Daughter [facility]) requesting to be called when covid results come back        Leroy Frost RN  04/19/21 2027

## 2021-04-20 NOTE — ASSESSMENT & PLAN NOTE
No results found for: HGBA1C    No results for input(s): POCGLU in the last 72 hours  · Cannot recall her medications states she gets weekly injections and is possibly on metformin? ?  · Will need to confirm home medications in a m    · Last hemoglobin A1c 11 9 on 3/12/21 at [facility]  · Will monitor Accu-Cheks, sliding scale for coverage  · Patient may need initiation of insulin

## 2021-04-20 NOTE — ASSESSMENT & PLAN NOTE
This is a 68-year-old female with history of hypertension, diabetes mellitus presented with generalized weakness, body aches and bladder discomfort  Has been having the symptoms for the past 1 week  Was initially seen in the ED on 04/13/2021 at which point patient had left arm numbness and weakness for 3 days  Patient also had urinary frequency and suprapubic tenderness  She underwent CT head which was negative and CT abdomen pelvis which were without any acute findings  Urinalysis revealed acute cystitis and patient was discharged home on Macrobid  Returning now with suprapubic pain and generalized weakness      · Unclear etiology of patient's symptoms, upon my examination patient denies any complaints  · Will repeat urinalysis  · COVID negative

## 2021-04-20 NOTE — ASSESSMENT & PLAN NOTE
This is a 72-year-old female with history of hypertension, diabetes mellitus presented with generalized weakness, body aches and bladder discomfort  Has been having the symptoms for the past 1 week  Was initially seen in the ED on 04/13/2021 at which point patient had left arm numbness and weakness for 3 days  Patient also had urinary frequency and suprapubic tenderness  She underwent CT head which was negative and CT abdomen pelvis which were without any acute findings  Urinalysis revealed acute cystitis and patient was discharged home on Macrobid  Returning now with suprapubic pain and generalized weakness  · Unclear etiology of patient's symptoms  · Patient continues to report all her symptoms have resolved  · She is ambulating in the room with no difficulty  Denies any dizziness or weakness  No paresthesias  No focal deficits on exam    · Urinalysis on admission insignificant for UTI  · COVID negative    Her symptoms likely secondary to hypertensive urgency, counseled on good blood pressure control    Also counseled on good blood sugar control

## 2021-04-29 NOTE — PHYSICIAN ADVISOR
Current patient class: Inpatient  The patient is currently on Hospital Day: 2 at [address] [place] [address]      The patient was admitted to the hospital  on 4/19/21 at 2053 for the following diagnosis:  Weakness [R53 1]     After review of the relevant documentation, labs, vital signs and test results, the patient is a provider liable case and is most appropriate for OBSERVATION STATUS  In this particular case the patient was admitted to the hospital as an inpatient  The patient however fails to satisfy the 2 midnight benchmark and closer scrutiny of the case is warranted  After review of the patient presentation and relevant labs the patient was most appropriate for observation status on admission  Given that this patient has already been discharged prior to this review they become a provider liable case  Rationale is as follows: The patient is a 79 yrs   Female who presented to the ED at 4/19/2021  4:35 PM with a chief complaint of Weakness - Generalized (Generalized weakness, fatigue, body aches, headache  No known fever  )   Rationale is as follows: The patient is a She has been having the symptoms for the past 1 week  Was initially seen in the ED on 04/13/2021 at which point patient had left arm numbness and weakness for 3 days  Patient also had urinary frequency and suprapubic tenderness  She underwent CT head which was negative and CT abdomen pelvis which were without any acute findings  Urinalysis revealed acute cystitis and patient was discharged home on Macrobid  She returns now with suprapubic pain and generalized weakness  Labs unremarkable covid negative  She was treated with IV hydralazine in ER  By hospital day 2 was asymptomatic and ambulating without difficulty      The patients vitals on arrival were   ED Triage Vitals [04/19/21 1536]   Temperature Pulse Respirations Blood Pressure SpO2   98 2 °F (36 8 °C) 85 18 143/91 98 %      Temp Source Heart Rate Source Patient Position - Orthostatic VS BP Location FiO2 (%)   Oral Monitor Lying Right arm --      Pain Score       Worst Possible Pain           Past Medical History:   Diagnosis Date    Diabetes mellitus ([address] )     Hypertension      Past Surgical History:   Procedure Laterality Date     SECTION             Consults have been placed to:   None    Vitals:    21 0030 21 0301 21 0745 21 1141   BP: 159/74 148/74 154/90 (!) 174/74   BP Location: Right arm Right arm Left arm Left arm   Pulse: 70 64 71 66   Resp: 14 16 16 16   Temp:   98 2 °F (36 8 °C) 98 °F (36 7 °C)   TempSrc:   Oral Oral   SpO2: 97% 98% 98% 99%   Weight:       Height:           Most recent labs:    No results for input(s): WBC, HGB, HCT, PLT, K, NA, CALCIUM, BUN, CREATININE, LIPASE, AMYLASE, INR, TROPONINI, CKTOTAL, AST, ALT, ALKPHOS, BILITOT in the last 72 hours  Scheduled Meds:  Continuous Infusions:No current facility-administered medications for this encounter  PRN Meds:      Surgical procedures (if appropriate):

## 2021-06-21 ENCOUNTER — HOSPITAL ENCOUNTER (EMERGENCY)
Facility: HOSPITAL | Age: 71
Discharge: HOME | End: 2021-06-21
Attending: EMERGENCY MEDICINE
Payer: MEDICARE

## 2021-06-21 VITALS
OXYGEN SATURATION: 98 % | RESPIRATION RATE: 16 BRPM | HEART RATE: 80 BPM | TEMPERATURE: 98.5 F | BODY MASS INDEX: 17.44 KG/M2 | DIASTOLIC BLOOD PRESSURE: 73 MMHG | WEIGHT: 108.03 LBS | SYSTOLIC BLOOD PRESSURE: 158 MMHG

## 2021-06-21 DIAGNOSIS — Z76.0 ENCOUNTER FOR MEDICATION REFILL: Primary | ICD-10-CM

## 2021-06-21 DIAGNOSIS — Z76.0 MEDICATION REFILL: ICD-10-CM

## 2021-06-21 LAB — GLUCOSE SERPL-MCNC: 118 MG/DL (ref 65–140)

## 2021-06-21 PROCEDURE — 82948 REAGENT STRIP/BLOOD GLUCOSE: CPT

## 2021-06-21 PROCEDURE — 99283 EMERGENCY DEPT VISIT LOW MDM: CPT | Performed by: EMERGENCY MEDICINE

## 2021-06-21 PROCEDURE — 99283 EMERGENCY DEPT VISIT LOW MDM: CPT

## 2021-06-21 RX ORDER — LISINOPRIL 20 MG/1
20 TABLET ORAL DAILY
Qty: 30 TABLET | Refills: 0 | Status: SHIPPED | OUTPATIENT
Start: 2021-06-21

## 2021-06-21 NOTE — ED ATTENDING ATTESTATION
6/21/2021  I, Solange Bedoya MD, saw and evaluated the patient  I have discussed the patient with the resident/non-physician practitioner and agree with the resident's/non-physician practitioner's findings, Plan of Care, and MDM as documented in the resident's/non-physician practitioner's note, except where noted  All available labs and Radiology studies were reviewed  I was present for key portions of any procedure(s) performed by the resident/non-physician practitioner and I was immediately available to provide assistance  At this point I agree with the current assessment done in the Emergency Department  I have conducted an independent evaluation of this patient a history and physical is as follows:  68-year-old female presents complaining of cough and requesting refill of her lisinopril  Patient offers no other complaints the this time  Ten systems otherwise negative    On exam no distress, lungs normal cardiac normal abdomen normal   Medical decision making;-will refill lisinopril, reassure, counseled, outpatient  ED Course         Critical Care Time  Procedures
normal S1, S2 heard

## 2021-06-21 NOTE — ED PROVIDER NOTES
History  Chief Complaint   Patient presents with    Cough     C/o cough for one week; denies covid contacts; ran out of lisinopril; no other complaints    Medication Refill     70-year-old female presents for medication refill  Patient reports she is out of her lisinopril, per chart review patient is also on amlodipine, patient shows me her amlodipine bottle chest vision out of pills  Patient was seen in the emergency department on 03/06/2021 and diagnosed with angioedema, she has followed up with her primary care provider per chart review since then and is still maintained on lisinopril with recent refill in May  Is uncertain when patient is out of her prescription  Patient also complains of a cough for the last 2 weeks that is nonproductive, she has not noticed any changes to it  Patient denies fevers, chills, dyspnea, chest pain, change in appetite  Patient is also concerned with her Trulicity injection, she states after getting her weekly injection she feels nauseous and has a lack of appetite  Patient wants to know if she can stop taking this  Translation services used for this encounter  Prior to Admission Medications   Prescriptions Last Dose Informant Patient Reported?  Taking?   acetaminophen (TYLENOL) 500 mg tablet   No No   Sig: Take 2 tablets (1,000 mg total) by mouth every 6 (six) hours as needed for mild pain   amLODIPine (NORVASC) 10 mg tablet   No No   Sig: Take 1 tablet (10 mg total) by mouth daily   aspirin 81 mg chewable tablet   Yes No   Sig: Chew 81 mg daily   atorvastatin (LIPITOR) 20 mg tablet   Yes No   Sig: Take 20 mg by mouth daily   diphenhydrAMINE (BENADRYL) 25 mg tablet   No No   Sig: Take 1 tablet (25 mg total) by mouth every 6 (six) hours as needed for itching   lisinopril (ZESTRIL) 20 mg tablet   No No   Sig: Take 1 tablet (20 mg total) by mouth daily   lisinopril (ZESTRIL) 20 mg tablet   No No   Sig: Take 1 tablet (20 mg total) by mouth daily   metFORMIN (GLUCOPHAGE) 1000 MG tablet   Yes No   Sig: Take 1,000 mg by mouth   mupirocin (BACTROBAN) 2 % ointment   No No   Sig: Apply topically 3 (three) times a day   polymyxin b-trimethoprim (POLYTRIM) ophthalmic solution   No No   Sig: Administer 1 drop to both eyes every 4 (four) hours      Facility-Administered Medications: None       Past Medical History:   Diagnosis Date    Diabetes mellitus ([facility] [address] )     Hypertension        Past Surgical History:   Procedure Laterality Date     SECTION         History reviewed  No pertinent family history  I have reviewed and agree with the history as documented  E-Cigarette/Vaping    E-Cigarette Use Never User      E-Cigarette/Vaping Substances     Social History     Tobacco Use    Smoking status: Never Smoker    Smokeless tobacco: Never Used   Vaping Use    Vaping Use: Never used   Substance Use Topics    Alcohol use: Never    Drug use: Never        Review of Systems   Constitutional: Negative for activity change, appetite change, chills and fever  Respiratory: Positive for cough  Negative for shortness of breath  Cardiovascular: Negative for chest pain  All other systems reviewed and are negative  Physical Exam  ED Triage Vitals [21 1647]   Temperature Pulse Respirations Blood Pressure SpO2   98 5 °F (36 9 °C) 80 16 158/73 98 %      Temp Source Heart Rate Source Patient Position - Orthostatic VS BP Location FiO2 (%)   Oral Monitor Lying -- --      Pain Score       No Pain             Orthostatic Vital Signs  Vitals:    21 1647   BP: 158/73   Pulse: 80   Patient Position - Orthostatic VS: Lying       Physical Exam  Vitals reviewed  Constitutional:       General: She is not in acute distress  Appearance: Normal appearance  She is not ill-appearing, toxic-appearing or diaphoretic  HENT:      Head: Normocephalic and atraumatic        Right Ear: External ear normal       Left Ear: External ear normal    Eyes:      General:         Right eye: No discharge  Left eye: No discharge  Cardiovascular:      Rate and Rhythm: Normal rate and regular rhythm  Pulmonary:      Effort: Pulmonary effort is normal  No respiratory distress  Breath sounds: Normal breath sounds  No stridor  No wheezing, rhonchi or rales  Musculoskeletal:         General: No deformity or signs of injury  Right lower leg: No edema  Left lower leg: No edema  Skin:     General: Skin is warm  Coloration: Skin is not jaundiced or pale  Neurological:      General: No focal deficit present  Mental Status: She is alert  Mental status is at baseline  ED Medications  Medications - No data to display    Diagnostic Studies  Results Reviewed     Procedure Component Value Units Date/Time    Fingerstick Glucose (POCT) [[id number]]  (Normal) Collected: 06/21/21 1815    Lab Status: Final result Updated: 06/21/21 1818     POC Glucose 118 mg/dl                  No orders to display         Procedures  Procedures      ED Course  ED Course as of Jun 21 2321   Mon Jun 21, 2021   1806 Patient was diagnosed with angioedema this spring, is still maintained on lisinopril and recently had refill sent for her in May  Will refill, encourage phone call to PCP tomorrow for trulicity complaints  Requesting fingerstick glucose  SBIRT 20yo+      Most Recent Value   SBIRT (24 yo +)   In order to provide better care to our patients, we are screening all of our patients for alcohol and drug use  Would it be okay to ask you these screening questions? No Filed at: 06/21/2021 1822                [facility]  Number of Diagnoses or Management Options  Encounter for medication refill  Diagnosis management comments: 77-year-old female presents for medication refill  Patient was diagnosed with angioedema in March of 2021, despite that she has been maintained on lisinopril  Patient reports she is out of her refill, will provide this for patient    Patient is focused on her Trulicity injections  It is recommended the patient call her primary care provider tomorrow and discuss this with them as she is due for next injection on Friday  Patient's daughter has another family member in the emergency department, daughter is present for and of the evaluation and reinforces calling her primary care provider to patient  Disposition  Final diagnoses:   Encounter for medication refill     Time reflects when diagnosis was documented in both MDM as applicable and the Disposition within this note     Time User Action Codes Description Comment    6/21/2021  6:08 PM [facility] Add [Z76 0] Encounter for medication refill     6/21/2021  6:10 PM [facility] Add [Z76 0] Medication refill       ED Disposition     ED Disposition Condition Date/Time Comment    Discharge Stable Mon Jun 21, 2021  6:08 PM Tegan Martinez discharge to home/self care  Follow-up Information     Follow up With Specialties Details Why Contact Shasha Louis, DO Family Medicine Call  Call your PCP tomorrow regarding your diabetes medicines   [facility]            Discharge Medication List as of 6/21/2021  6:10 PM      CONTINUE these medications which have CHANGED    Details   lisinopril (ZESTRIL) 20 mg tablet Take 1 tablet (20 mg total) by mouth daily, Starting Mon 6/21/2021, Print         CONTINUE these medications which have NOT CHANGED    Details   acetaminophen (TYLENOL) 500 mg tablet Take 2 tablets (1,000 mg total) by mouth every 6 (six) hours as needed for mild pain, Starting Mon 4/12/2021, Print      amLODIPine (NORVASC) 10 mg tablet Take 1 tablet (10 mg total) by mouth daily, Starting Tue 4/20/2021, Normal      aspirin 81 mg chewable tablet Chew 81 mg daily, Historical Med      atorvastatin (LIPITOR) 20 mg tablet Take 20 mg by mouth daily, Historical Med      diphenhydrAMINE (BENADRYL) 25 mg tablet Take 1 tablet (25 mg total) by mouth every 6 (six) hours as needed for itching, Starting Sat 3/6/2021, Print      metFORMIN (GLUCOPHAGE) 1000 MG tablet Take 1,000 mg by mouth, Starting Fri 3/12/2021, Until Sat 3/12/2022, Historical Med      mupirocin (BACTROBAN) 2 % ointment Apply topically 3 (three) times a day, Starting Sun 1/17/2021, Normal      polymyxin b-trimethoprim (POLYTRIM) ophthalmic solution Administer 1 drop to both eyes every 4 (four) hours, Starting Sun 1/17/2021, Normal           No discharge procedures on file  PDMP Review     None           ED Provider  Attending physically available and evaluated Jailene Pena  LUIS ALFREDO managed the patient along with the ED Attending      Electronically Signed by         Joce Hicks DO  06/21/21 6329

## 2021-11-17 ENCOUNTER — HOSPITAL ENCOUNTER (EMERGENCY)
Facility: HOSPITAL | Age: 71
Discharge: HOME | End: 2021-11-17
Attending: EMERGENCY MEDICINE | Admitting: EMERGENCY MEDICINE
Payer: MEDICARE

## 2021-11-17 VITALS
HEART RATE: 67 BPM | WEIGHT: 122.36 LBS | TEMPERATURE: 98.9 F | DIASTOLIC BLOOD PRESSURE: 94 MMHG | BODY MASS INDEX: 19.75 KG/M2 | RESPIRATION RATE: 18 BRPM | OXYGEN SATURATION: 97 % | SYSTOLIC BLOOD PRESSURE: 179 MMHG

## 2021-11-17 DIAGNOSIS — M54.2 NECK PAIN: Primary | ICD-10-CM

## 2021-11-17 PROCEDURE — 99284 EMERGENCY DEPT VISIT MOD MDM: CPT | Performed by: EMERGENCY MEDICINE

## 2021-11-17 PROCEDURE — 99283 EMERGENCY DEPT VISIT LOW MDM: CPT

## 2021-11-17 RX ORDER — CYCLOBENZAPRINE HCL 10 MG
5 TABLET ORAL 2 TIMES DAILY PRN
Qty: 20 TABLET | Refills: 0 | Status: SHIPPED | OUTPATIENT
Start: 2021-11-17

## 2021-12-05 ENCOUNTER — APPOINTMENT (EMERGENCY)
Dept: RADIOLOGY | Facility: HOSPITAL | Age: 71
End: 2021-12-05
Payer: MEDICARE

## 2021-12-05 ENCOUNTER — HOSPITAL ENCOUNTER (EMERGENCY)
Facility: HOSPITAL | Age: 71
Discharge: HOME | End: 2021-12-05
Attending: EMERGENCY MEDICINE | Admitting: EMERGENCY MEDICINE
Payer: MEDICARE

## 2021-12-05 ENCOUNTER — APPOINTMENT (EMERGENCY)
Dept: CT IMAGING | Facility: HOSPITAL | Age: 71
End: 2021-12-05
Payer: MEDICARE

## 2021-12-05 VITALS
DIASTOLIC BLOOD PRESSURE: 95 MMHG | BODY MASS INDEX: 19.57 KG/M2 | RESPIRATION RATE: 20 BRPM | HEART RATE: 65 BPM | SYSTOLIC BLOOD PRESSURE: 180 MMHG | TEMPERATURE: 97.1 F | OXYGEN SATURATION: 99 % | WEIGHT: 121.25 LBS

## 2021-12-05 DIAGNOSIS — R42 DIZZINESS: Primary | ICD-10-CM

## 2021-12-05 DIAGNOSIS — S16.1XXA STRAIN OF NECK MUSCLE, INITIAL ENCOUNTER: ICD-10-CM
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
## 2021-12-05 DIAGNOSIS — D32.9 MENINGIOMA (HCC): ICD-10-CM

## 2021-12-05 DIAGNOSIS — M54.2 NECK PAIN: ICD-10-CM

## 2021-12-05 DIAGNOSIS — I10 HYPERTENSION: ICD-10-CM

## 2021-12-05 LAB
2HR DELTA HS TROPONIN: >1 NG/L
ALBUMIN SERPL BCP-MCNC: 4.4 G/DL (ref 3–5.2)
ALP SERPL-CCNC: 77 U/L (ref 43–122)
ALT SERPL W P-5'-P-CCNC: 16 U/L
ANION GAP SERPL CALCULATED.3IONS-SCNC: 6 MMOL/L (ref 5–14)
APTT PPP: 24 SECONDS (ref 23–37)
AST SERPL W P-5'-P-CCNC: 29 U/L (ref 14–36)
BACTERIA UR QL AUTO: ABNORMAL /HPF
BASOPHILS # BLD AUTO: 0.1 THOUSANDS/ΜL (ref 0–0.1)
BASOPHILS NFR BLD AUTO: 1 % (ref 0–1)
BILIRUB SERPL-MCNC: 0.49 MG/DL
BILIRUB UR QL STRIP: NEGATIVE
BUN SERPL-MCNC: 15 MG/DL (ref 5–25)
CALCIUM SERPL-MCNC: 9.7 MG/DL (ref 8.4–10.2)
CARDIAC TROPONIN I PNL SERPL HS: 3 NG/L
CARDIAC TROPONIN I PNL SERPL HS: <2 NG/L
CHLORIDE SERPL-SCNC: 100 MMOL/L (ref 97–108)
CLARITY UR: ABNORMAL
CO2 SERPL-SCNC: 31 MMOL/L (ref 22–30)
COLOR UR: YELLOW
CREAT SERPL-MCNC: 0.78 MG/DL (ref 0.6–1.2)
EOSINOPHIL # BLD AUTO: 0.1 THOUSAND/ΜL (ref 0–0.4)
EOSINOPHIL NFR BLD AUTO: 1 % (ref 0–6)
ERYTHROCYTE [DISTWIDTH] IN BLOOD BY AUTOMATED COUNT: 12.5 %
GFR SERPL CREATININE-BSD FRML MDRD: 77 ML/MIN/1.73SQ M
GLUCOSE SERPL-MCNC: 222 MG/DL (ref 70–99)
GLUCOSE UR STRIP-MCNC: NEGATIVE MG/DL
HCT VFR BLD AUTO: 42.7 % (ref 36–46)
HGB BLD-MCNC: 14.3 G/DL (ref 12–16)
HGB UR QL STRIP.AUTO: 10
INR PPP: 0.97 (ref 0.84–1.19)
KETONES UR STRIP-MCNC: NEGATIVE MG/DL
LEUKOCYTE ESTERASE UR QL STRIP: NEGATIVE
LYMPHOCYTES # BLD AUTO: 2.9 THOUSANDS/ΜL (ref 0.5–4)
LYMPHOCYTES NFR BLD AUTO: 30 % (ref 25–45)
MAGNESIUM SERPL-MCNC: 1.9 MG/DL (ref 1.6–2.3)
MCH RBC QN AUTO: 30.4 PG (ref 26–34)
MCHC RBC AUTO-ENTMCNC: 33.5 G/DL (ref 31–36)
MCV RBC AUTO: 91 FL (ref 80–100)
MONOCYTES # BLD AUTO: 0.6 THOUSAND/ΜL (ref 0.2–0.9)
MONOCYTES NFR BLD AUTO: 6 % (ref 1–10)
NEUTROPHILS # BLD AUTO: 6 THOUSANDS/ΜL (ref 1.8–7.8)
NEUTS SEG NFR BLD AUTO: 61 % (ref 45–65)
NITRITE UR QL STRIP: NEGATIVE
NON-SQ EPI CELLS URNS QL MICRO: ABNORMAL /HPF
NT-PROBNP SERPL-MCNC: 181 PG/ML (ref 0–299)
PH UR STRIP.AUTO: 6.5 [PH]
PLATELET # BLD AUTO: 285 THOUSANDS/UL (ref 150–450)
PMV BLD AUTO: 8 FL (ref 8.9–12.7)
POTASSIUM SERPL-SCNC: 4 MMOL/L (ref 3.6–5)
PROT SERPL-MCNC: 8 G/DL (ref 5.9–8.4)
PROT UR STRIP-MCNC: ABNORMAL MG/DL
PROTHROMBIN TIME: 12.5 SECONDS (ref 11.6–14.5)
RBC # BLD AUTO: 4.7 MILLION/UL (ref 4–5.2)
RBC #/AREA URNS AUTO: ABNORMAL /HPF
SODIUM SERPL-SCNC: 137 MMOL/L (ref 137–147)
SP GR UR STRIP.AUTO: 1.02 (ref 1–1.04)
TSH SERPL DL<=0.05 MIU/L-ACNC: 1.74 UIU/ML (ref 0.47–4.68)
UROBILINOGEN UA: NEGATIVE MG/DL
WBC # BLD AUTO: 9.8 THOUSAND/UL (ref 4.5–11)
WBC #/AREA URNS AUTO: ABNORMAL /HPF

## 2021-12-05 PROCEDURE — 36415 COLL VENOUS BLD VENIPUNCTURE: CPT | Performed by: EMERGENCY MEDICINE

## 2021-12-05 PROCEDURE — 84484 ASSAY OF TROPONIN QUANT: CPT | Performed by: EMERGENCY MEDICINE

## 2021-12-05 PROCEDURE — 73060 X-RAY EXAM OF HUMERUS: CPT

## 2021-12-05 PROCEDURE — 96361 HYDRATE IV INFUSION ADD-ON: CPT

## 2021-12-05 PROCEDURE — 93005 ELECTROCARDIOGRAM TRACING: CPT

## 2021-12-05 PROCEDURE — 70496 CT ANGIOGRAPHY HEAD: CPT

## 2021-12-05 PROCEDURE — 85730 THROMBOPLASTIN TIME PARTIAL: CPT | Performed by: EMERGENCY MEDICINE

## 2021-12-05 PROCEDURE — 99285 EMERGENCY DEPT VISIT HI MDM: CPT | Performed by: EMERGENCY MEDICINE

## 2021-12-05 PROCEDURE — 71045 X-RAY EXAM CHEST 1 VIEW: CPT

## 2021-12-05 PROCEDURE — 70498 CT ANGIOGRAPHY NECK: CPT

## 2021-12-05 PROCEDURE — 84443 ASSAY THYROID STIM HORMONE: CPT | Performed by: EMERGENCY MEDICINE

## 2021-12-05 PROCEDURE — 80053 COMPREHEN METABOLIC PANEL: CPT | Performed by: EMERGENCY MEDICINE

## 2021-12-05 PROCEDURE — 96374 THER/PROPH/DIAG INJ IV PUSH: CPT

## 2021-12-05 PROCEDURE — 99285 EMERGENCY DEPT VISIT HI MDM: CPT

## 2021-12-05 PROCEDURE — 96375 TX/PRO/DX INJ NEW DRUG ADDON: CPT

## 2021-12-05 PROCEDURE — G1004 CDSM NDSC: HCPCS

## 2021-12-05 PROCEDURE — 83735 ASSAY OF MAGNESIUM: CPT | Performed by: EMERGENCY MEDICINE

## 2021-12-05 PROCEDURE — 83880 ASSAY OF NATRIURETIC PEPTIDE: CPT | Performed by: EMERGENCY MEDICINE

## 2021-12-05 PROCEDURE — 81001 URINALYSIS AUTO W/SCOPE: CPT | Performed by: EMERGENCY MEDICINE

## 2021-12-05 PROCEDURE — 85610 PROTHROMBIN TIME: CPT | Performed by: EMERGENCY MEDICINE

## 2021-12-05 PROCEDURE — 85025 COMPLETE CBC W/AUTO DIFF WBC: CPT | Performed by: EMERGENCY MEDICINE

## 2021-12-05 RX ORDER — LIDOCAINE 50 MG/G
1 PATCH TOPICAL DAILY
Qty: 4 PATCH | Refills: 0 | Status: SHIPPED | OUTPATIENT
Start: 2021-12-05 | End: 2021-12-09

## 2021-12-05 RX ORDER — ACETAMINOPHEN 500 MG
500 TABLET ORAL EVERY 6 HOURS PRN
Qty: 12 TABLET | Refills: 0 | Status: SHIPPED | OUTPATIENT
Start: 2021-12-05 | End: 2021-12-08

## 2021-12-05 RX ORDER — KETOROLAC TROMETHAMINE 30 MG/ML
15 INJECTION, SOLUTION INTRAMUSCULAR; INTRAVENOUS ONCE
Status: COMPLETED | OUTPATIENT
Start: 2021-12-05 | End: 2021-12-05

## 2021-12-05 RX ORDER — HYDRALAZINE HYDROCHLORIDE 20 MG/ML
5 INJECTION INTRAMUSCULAR; INTRAVENOUS ONCE
Status: COMPLETED | OUTPATIENT
Start: 2021-12-05 | End: 2021-12-05

## 2021-12-05 RX ORDER — CYCLOBENZAPRINE HCL 10 MG
10 TABLET ORAL 2 TIMES DAILY PRN
Qty: 6 TABLET | Refills: 0 | Status: SHIPPED | OUTPATIENT
Start: 2021-12-05 | End: 2022-06-04

## 2021-12-05 RX ORDER — CYCLOBENZAPRINE HCL 10 MG
10 TABLET ORAL ONCE
Status: COMPLETED | OUTPATIENT
Start: 2021-12-05 | End: 2021-12-05

## 2021-12-05 RX ORDER — LIDOCAINE 50 MG/G
1 PATCH TOPICAL ONCE
Status: DISCONTINUED | OUTPATIENT
Start: 2021-12-05 | End: 2021-12-06 | Stop reason: HOSPADM

## 2021-12-05 RX ORDER — ACETAMINOPHEN 325 MG/1
650 TABLET ORAL ONCE
Status: COMPLETED | OUTPATIENT
Start: 2021-12-05 | End: 2021-12-05

## 2021-12-05 RX ADMIN — LIDOCAINE 1 PATCH: 50 PATCH TOPICAL at 22:52

## 2021-12-05 RX ADMIN — CYCLOBENZAPRINE HYDROCHLORIDE 10 MG: 10 TABLET, FILM COATED ORAL at 22:55

## 2021-12-05 RX ADMIN — ACETAMINOPHEN 650 MG: 325 TABLET ORAL at 19:34

## 2021-12-05 RX ADMIN — IOHEXOL 100 ML: 350 INJECTION, SOLUTION INTRAVENOUS at 20:56

## 2021-12-05 RX ADMIN — SODIUM CHLORIDE 1000 ML: 0.9 INJECTION, SOLUTION INTRAVENOUS at 21:09

## 2021-12-05 RX ADMIN — KETOROLAC TROMETHAMINE 15 MG: 30 INJECTION, SOLUTION INTRAMUSCULAR; INTRAVENOUS at 22:50

## 2021-12-05 RX ADMIN — HYDRALAZINE HYDROCHLORIDE 5 MG: 20 INJECTION INTRAMUSCULAR; INTRAVENOUS at 22:49

## 2021-12-06 LAB
ATRIAL RATE: 61 BPM
ATRIAL RATE: 61 BPM
P AXIS: -8 DEGREES
P AXIS: 49 DEGREES
PR INTERVAL: 164 MS
PR INTERVAL: 174 MS
QRS AXIS: 16 DEGREES
QRS AXIS: 43 DEGREES
QRSD INTERVAL: 76 MS
QRSD INTERVAL: 88 MS
QT INTERVAL: 410 MS
QT INTERVAL: 438 MS
QTC INTERVAL: 412 MS
QTC INTERVAL: 440 MS
T WAVE AXIS: -1 DEGREES
T WAVE AXIS: 45 DEGREES
VENTRICULAR RATE: 61 BPM
VENTRICULAR RATE: 61 BPM

## 2021-12-06 PROCEDURE — 93010 ELECTROCARDIOGRAM REPORT: CPT | Performed by: INTERNAL MEDICINE

## 2022-06-03 ENCOUNTER — APPOINTMENT (EMERGENCY)
Dept: RADIOLOGY | Facility: HOSPITAL | Age: 72
DRG: 305 | End: 2022-06-03
Payer: MEDICARE

## 2022-06-03 ENCOUNTER — HOSPITAL ENCOUNTER (INPATIENT)
Facility: HOSPITAL | Age: 72
LOS: 1 days | Discharge: HOME | DRG: 305 | End: 2022-06-04
Attending: EMERGENCY MEDICINE | Admitting: INTERNAL MEDICINE
Payer: MEDICARE

## 2022-06-03 DIAGNOSIS — I16.0 HYPERTENSIVE URGENCY: ICD-10-CM

## 2022-06-03 DIAGNOSIS — R07.9 CHEST PAIN: Primary | ICD-10-CM

## 2022-06-03 PROBLEM — R07.89 CHEST PAIN, ATYPICAL: Status: ACTIVE | Noted: 2022-06-03

## 2022-06-03 PROBLEM — I16.1 HYPERTENSIVE EMERGENCY: Status: ACTIVE | Noted: 2022-06-03

## 2022-06-03 PROBLEM — I16.1 HYPERTENSIVE EMERGENCY: Status: RESOLVED | Noted: 2022-06-03 | Resolved: 2022-06-03

## 2022-06-03 LAB
2HR DELTA HS TROPONIN: 3 NG/L
4HR DELTA HS TROPONIN: 2 NG/L
ALBUMIN SERPL BCP-MCNC: 3.3 G/DL (ref 3.5–5)
ALP SERPL-CCNC: 91 U/L (ref 46–116)
ALT SERPL W P-5'-P-CCNC: 19 U/L (ref 12–78)
ANION GAP SERPL CALCULATED.3IONS-SCNC: 6 MMOL/L (ref 4–13)
AST SERPL W P-5'-P-CCNC: 34 U/L (ref 5–45)
ATRIAL RATE: 69 BPM
BASOPHILS # BLD AUTO: 0.09 THOUSANDS/ΜL (ref 0–0.1)
BASOPHILS NFR BLD AUTO: 1 % (ref 0–1)
BILIRUB SERPL-MCNC: 0.37 MG/DL (ref 0.2–1)
BUN SERPL-MCNC: 14 MG/DL (ref 5–25)
CALCIUM ALBUM COR SERPL-MCNC: 10.2 MG/DL (ref 8.3–10.1)
CALCIUM SERPL-MCNC: 9.6 MG/DL (ref 8.3–10.1)
CARDIAC TROPONIN I PNL SERPL HS: 3 NG/L
CARDIAC TROPONIN I PNL SERPL HS: 5 NG/L
CARDIAC TROPONIN I PNL SERPL HS: 6 NG/L
CHLORIDE SERPL-SCNC: 104 MMOL/L (ref 100–108)
CO2 SERPL-SCNC: 28 MMOL/L (ref 21–32)
CREAT SERPL-MCNC: 1.16 MG/DL (ref 0.6–1.3)
EOSINOPHIL # BLD AUTO: 0.26 THOUSAND/ΜL (ref 0–0.61)
EOSINOPHIL NFR BLD AUTO: 3 % (ref 0–6)
ERYTHROCYTE [DISTWIDTH] IN BLOOD BY AUTOMATED COUNT: 12.6 % (ref 11.6–15.1)
EST. AVERAGE GLUCOSE BLD GHB EST-MCNC: 151 MG/DL
GFR SERPL CREATININE-BSD FRML MDRD: 47 ML/MIN/1.73SQ M
GLUCOSE SERPL-MCNC: 133 MG/DL (ref 65–140)
GLUCOSE SERPL-MCNC: 198 MG/DL (ref 65–140)
GLUCOSE SERPL-MCNC: 251 MG/DL (ref 65–140)
HBA1C MFR BLD: 6.9 %
HCT VFR BLD AUTO: 42.6 % (ref 34.8–46.1)
HGB BLD-MCNC: 14 G/DL (ref 11.5–15.4)
IMM GRANULOCYTES # BLD AUTO: 0.06 THOUSAND/UL (ref 0–0.2)
IMM GRANULOCYTES NFR BLD AUTO: 1 % (ref 0–2)
LYMPHOCYTES # BLD AUTO: 2.69 THOUSANDS/ΜL (ref 0.6–4.47)
LYMPHOCYTES NFR BLD AUTO: 27 % (ref 14–44)
MCH RBC QN AUTO: 30.2 PG (ref 26.8–34.3)
MCHC RBC AUTO-ENTMCNC: 32.9 G/DL (ref 31.4–37.4)
MCV RBC AUTO: 92 FL (ref 82–98)
MONOCYTES # BLD AUTO: 0.57 THOUSAND/ΜL (ref 0.17–1.22)
MONOCYTES NFR BLD AUTO: 6 % (ref 4–12)
NEUTROPHILS # BLD AUTO: 6.31 THOUSANDS/ΜL (ref 1.85–7.62)
NEUTS SEG NFR BLD AUTO: 62 % (ref 43–75)
NRBC BLD AUTO-RTO: 0 /100 WBCS
NT-PROBNP SERPL-MCNC: 183 PG/ML
P AXIS: 71 DEGREES
PLATELET # BLD AUTO: 251 THOUSANDS/UL (ref 149–390)
PMV BLD AUTO: 10.5 FL (ref 8.9–12.7)
POTASSIUM SERPL-SCNC: 3.8 MMOL/L (ref 3.5–5.3)
PR INTERVAL: 168 MS
PROT SERPL-MCNC: 7.5 G/DL (ref 6.4–8.2)
QRS AXIS: 55 DEGREES
QRSD INTERVAL: 84 MS
QT INTERVAL: 404 MS
QTC INTERVAL: 432 MS
RBC # BLD AUTO: 4.64 MILLION/UL (ref 3.81–5.12)
SODIUM SERPL-SCNC: 138 MMOL/L (ref 136–145)
T WAVE AXIS: 60 DEGREES
VENTRICULAR RATE: 69 BPM
WBC # BLD AUTO: 9.98 THOUSAND/UL (ref 4.31–10.16)

## 2022-06-03 PROCEDURE — 82948 REAGENT STRIP/BLOOD GLUCOSE: CPT

## 2022-06-03 PROCEDURE — 99285 EMERGENCY DEPT VISIT HI MDM: CPT

## 2022-06-03 PROCEDURE — 85025 COMPLETE CBC W/AUTO DIFF WBC: CPT | Performed by: EMERGENCY MEDICINE

## 2022-06-03 PROCEDURE — 99285 EMERGENCY DEPT VISIT HI MDM: CPT | Performed by: EMERGENCY MEDICINE

## 2022-06-03 PROCEDURE — 84484 ASSAY OF TROPONIN QUANT: CPT | Performed by: STUDENT IN AN ORGANIZED HEALTH CARE EDUCATION/TRAINING PROGRAM

## 2022-06-03 PROCEDURE — 71045 X-RAY EXAM CHEST 1 VIEW: CPT

## 2022-06-03 PROCEDURE — 83036 HEMOGLOBIN GLYCOSYLATED A1C: CPT | Performed by: STUDENT IN AN ORGANIZED HEALTH CARE EDUCATION/TRAINING PROGRAM

## 2022-06-03 PROCEDURE — NC001 PR NO CHARGE: Performed by: INTERNAL MEDICINE

## 2022-06-03 PROCEDURE — 36415 COLL VENOUS BLD VENIPUNCTURE: CPT | Performed by: EMERGENCY MEDICINE

## 2022-06-03 PROCEDURE — 80053 COMPREHEN METABOLIC PANEL: CPT | Performed by: EMERGENCY MEDICINE

## 2022-06-03 PROCEDURE — 93005 ELECTROCARDIOGRAM TRACING: CPT

## 2022-06-03 PROCEDURE — 83880 ASSAY OF NATRIURETIC PEPTIDE: CPT | Performed by: STUDENT IN AN ORGANIZED HEALTH CARE EDUCATION/TRAINING PROGRAM

## 2022-06-03 PROCEDURE — 93010 ELECTROCARDIOGRAM REPORT: CPT | Performed by: INTERNAL MEDICINE

## 2022-06-03 PROCEDURE — 99223 1ST HOSP IP/OBS HIGH 75: CPT | Performed by: INTERNAL MEDICINE

## 2022-06-03 PROCEDURE — 84484 ASSAY OF TROPONIN QUANT: CPT | Performed by: EMERGENCY MEDICINE

## 2022-06-03 RX ORDER — ASPIRIN 81 MG/1
324 TABLET, CHEWABLE ORAL ONCE
Status: COMPLETED | OUTPATIENT
Start: 2022-06-03 | End: 2022-06-03

## 2022-06-03 RX ORDER — LISINOPRIL 20 MG/1
20 TABLET ORAL DAILY
Status: DISCONTINUED | OUTPATIENT
Start: 2022-06-04 | End: 2022-06-04 | Stop reason: HOSPADM

## 2022-06-03 RX ORDER — AMLODIPINE BESYLATE 10 MG/1
10 TABLET ORAL DAILY
Status: DISCONTINUED | OUTPATIENT
Start: 2022-06-03 | End: 2022-06-04 | Stop reason: HOSPADM

## 2022-06-03 RX ORDER — HYDRALAZINE HYDROCHLORIDE 25 MG/1
25 TABLET, FILM COATED ORAL EVERY 8 HOURS SCHEDULED
Status: DISCONTINUED | OUTPATIENT
Start: 2022-06-03 | End: 2022-06-03

## 2022-06-03 RX ORDER — NITROGLYCERIN 0.4 MG/1
0.4 TABLET SUBLINGUAL
Status: DISCONTINUED | OUTPATIENT
Start: 2022-06-03 | End: 2022-06-04 | Stop reason: HOSPADM

## 2022-06-03 RX ORDER — HYDRALAZINE HYDROCHLORIDE 20 MG/ML
10 INJECTION INTRAMUSCULAR; INTRAVENOUS EVERY 6 HOURS PRN
Status: DISCONTINUED | OUTPATIENT
Start: 2022-06-03 | End: 2022-06-04

## 2022-06-03 RX ORDER — INSULIN LISPRO 100 [IU]/ML
1-5 INJECTION, SOLUTION INTRAVENOUS; SUBCUTANEOUS
Status: DISCONTINUED | OUTPATIENT
Start: 2022-06-03 | End: 2022-06-04 | Stop reason: HOSPADM

## 2022-06-03 RX ORDER — ATORVASTATIN CALCIUM 20 MG/1
20 TABLET, FILM COATED ORAL DAILY
Status: DISCONTINUED | OUTPATIENT
Start: 2022-06-03 | End: 2022-06-03

## 2022-06-03 RX ORDER — LABETALOL HYDROCHLORIDE 5 MG/ML
10 INJECTION, SOLUTION INTRAVENOUS EVERY 6 HOURS
Status: DISCONTINUED | OUTPATIENT
Start: 2022-06-03 | End: 2022-06-04

## 2022-06-03 RX ORDER — ASPIRIN 81 MG/1
81 TABLET, CHEWABLE ORAL DAILY
Status: DISCONTINUED | OUTPATIENT
Start: 2022-06-03 | End: 2022-06-03

## 2022-06-03 RX ORDER — ASPIRIN 81 MG/1
81 TABLET, CHEWABLE ORAL DAILY
Status: DISCONTINUED | OUTPATIENT
Start: 2022-06-04 | End: 2022-06-04 | Stop reason: HOSPADM

## 2022-06-03 RX ORDER — LISINOPRIL 20 MG/1
20 TABLET ORAL DAILY
Status: DISCONTINUED | OUTPATIENT
Start: 2022-06-03 | End: 2022-06-03

## 2022-06-03 RX ORDER — ATORVASTATIN CALCIUM 40 MG/1
40 TABLET, FILM COATED ORAL
Status: DISCONTINUED | OUTPATIENT
Start: 2022-06-04 | End: 2022-06-04 | Stop reason: HOSPADM

## 2022-06-03 RX ORDER — HEPARIN SODIUM 5000 [USP'U]/ML
5000 INJECTION, SOLUTION INTRAVENOUS; SUBCUTANEOUS EVERY 8 HOURS SCHEDULED
Status: DISCONTINUED | OUTPATIENT
Start: 2022-06-03 | End: 2022-06-04 | Stop reason: HOSPADM

## 2022-06-03 RX ORDER — HYDRALAZINE HYDROCHLORIDE 10 MG/1
10 TABLET, FILM COATED ORAL EVERY 8 HOURS SCHEDULED
Status: DISCONTINUED | OUTPATIENT
Start: 2022-06-03 | End: 2022-06-03

## 2022-06-03 RX ORDER — AMLODIPINE BESYLATE 5 MG/1
5 TABLET ORAL DAILY
Status: DISCONTINUED | OUTPATIENT
Start: 2022-06-03 | End: 2022-06-03

## 2022-06-03 RX ORDER — ACETAMINOPHEN 325 MG/1
650 TABLET ORAL EVERY 6 HOURS PRN
Status: DISCONTINUED | OUTPATIENT
Start: 2022-06-03 | End: 2022-06-04 | Stop reason: HOSPADM

## 2022-06-03 RX ORDER — LISINOPRIL 20 MG/1
20 TABLET ORAL ONCE
Status: COMPLETED | OUTPATIENT
Start: 2022-06-03 | End: 2022-06-03

## 2022-06-03 RX ADMIN — ACETAMINOPHEN 650 MG: 325 TABLET, FILM COATED ORAL at 22:01

## 2022-06-03 RX ADMIN — LABETALOL HYDROCHLORIDE 10 MG: 5 INJECTION, SOLUTION INTRAVENOUS at 22:51

## 2022-06-03 RX ADMIN — HEPARIN SODIUM 5000 UNITS: 5000 INJECTION INTRAVENOUS; SUBCUTANEOUS at 21:33

## 2022-06-03 RX ADMIN — INSULIN LISPRO 1 UNITS: 100 INJECTION, SOLUTION INTRAVENOUS; SUBCUTANEOUS at 21:36

## 2022-06-03 RX ADMIN — AMLODIPINE BESYLATE 10 MG: 10 TABLET ORAL at 18:09

## 2022-06-03 RX ADMIN — NITROGLYCERIN 0.4 MG: 0.4 TABLET SUBLINGUAL at 11:32

## 2022-06-03 RX ADMIN — LISINOPRIL 20 MG: 20 TABLET ORAL at 12:56

## 2022-06-03 RX ADMIN — HYDRALAZINE HYDROCHLORIDE 10 MG: 20 INJECTION, SOLUTION INTRAMUSCULAR; INTRAVENOUS at 16:12

## 2022-06-03 RX ADMIN — ASPIRIN 81 MG CHEWABLE TABLET 324 MG: 81 TABLET CHEWABLE at 14:29

## 2022-06-03 RX ADMIN — Medication 6.25 MG: at 21:32

## 2022-06-03 NOTE — ASSESSMENT & PLAN NOTE
Patient has PMH of Type 2 DM    Blood glucose on admission 251, BG 6 months prior 222    · HBA1c- 6 9  · Will resume home Metformin at DC  · F/u outpatient PCP for DM2 management

## 2022-06-03 NOTE — ASSESSMENT & PLAN NOTE
Patient has PMH of hypertension and hypertensive urgency  She presented to the ED today with a BM of 182/98 but was also measured at 205/93 and 204/134 during rechecks  Patient states she ran out of her lisinopril yesterday and has not taken it today, patient denies recent changes to or non-adherence with diet  She notes having intermittent blurry vision for the past week, with 1 5 days of vertigo and chest pain which resolved with administration of lisinopril in the ED

## 2022-06-03 NOTE — PROGRESS NOTES
INTERNAL MEDICINE RESIDENCY SENIOR ADMISSION NOTE     Name: Ese Rosales   Age & Sex: 67 y o  female   MRN: [id number]  Unit/Bed#: ED 12   Encounter: [id number]  Primary Care Provider: No primary care provider on file  Admit to team: SOD Team A    Patient seen and examined  Reviewed H&P per Sub-I Yvette   Agree with the assessment and plan with any exception/addition as noted below:    Pt is a 66 y/o F w/ PMHx HTN, HLD, DM who presented to the ED with 2 day hx of atypical left sided chest pain  Denies association with activity  No hx of CAD or MI  Used to follow up with PCP but the provider left the country per patient  She ran out of her medications  She was hypertensive to 200s in the ED with improvement with lisinopril 20mg given in the ED and NTG   4  Currently she is pain free  Feels she is back to baseline  Does have intermittent blurry vision but not at this time  On exam lungs were clear with no crackles or wheezing, heart sounds were normal with no murmurs, rubs, or gallops  No edema was noted       /73   Pulse 82   Temp 98 3 °F (36 8 °C) (Temporal)   Resp 17   SpO2 98%     CXR: No acute cardiopul disease   EKG with non-specific T waves  Trops negative  Cards evaluated the pt  Will get Nuc stress test   Medsurg tele  Nitrates prn, ACE, ASA, STATIN, B-Blocker   BP goal 140-160 for next 24-48 hrs then <130     Principal Problem:    Hypertensive urgency  Active Problems:    Diabetes mellitus (HCC)    Chest pain, atypical      Code Status: Level 1 - Full Code  Admission Status: INPATIENT   Disposition: Patient requires Med/Surg with Telemetry  Expected Length of Stay: 2

## 2022-06-03 NOTE — H&P
INTERNAL MEDICINE RESIDENCY ADMISSION H&P     Name: Deyanira Rome   Age & Sex: 67 y o  female   MRN: [id number]  Unit/Bed#: ED 12   Encounter: [id number]  Primary Care Provider: No primary care provider on file  Code Status: Level 1 - Full Code  Admission Status: INPATIENT   Disposition: Patient requires Med/Surg with Telemetry    Admit to team: SOD Team A    ASSESSMENT/PLAN     Principal Problem:    Hypertensive urgency  Active Problems:    Chest pain, atypical    Diabetes mellitus ([facility] [address] )      * Hypertensive urgency  Assessment & Plan  Patient has PMH of hypertension, DM2 and dyslipidemia  She presented to the ED today with a BP of 205/93  Patient states she ran out of her lisinopril yesterday and has not taken it today, patient denies recent changes to or non-adherence with diet  She notes 1 5 days of dizziness and chest pain which resolved several hours following administration of lisinopril and nitroglycerine in the ED  Subsequent BP checks showed 182/98 and 169/87  · Continue to monitor BP  · Continue home medications of Amlodipine 10mg daily, Lisinopril 20mg daily  · Titrate as necessary to maintain -160mmHg  · Stress diet      Chest pain, atypical  Assessment & Plan  Patient presents to ED with a 1 5 day history of  left sided, constant chest pain unrelated to activity and without radiation  Patient reports last dose of Lisinopril was yesterday and she was unable to take  today as her prescription ran out  Patient endorses complete resolution of symptoms following administration of nitroglycerin, aspirin, and lisinopril in the ED  EKG shows nonspecific changes likely in the context of hypertensive urgency,  HS Troponin negative x1  Unremarkable CXR with no acute cardiopulmonary disease       · Nuclear Stress Test  · Stress diet  · NT-BNP  · Trend troponin x3  · Continue home amlodipine 10mg daily, Lisinopril 20mg daily  · Telemetry  · Cardiology consulted, appreciate recommendations     Diabetes mellitus [facility])  Assessment & Plan  Patient has PMH of Type 2 DM  Blood glucose on admission 251, BG 6 months prior 222    · Sliding scale insulin Lispro TID before meals and daily before bedtime with target between 140-180 mg/dl  · POCT Glucose  · HBA1c  · Continue Atorvastatin 40mg daily  · F/u outpatient PCP for DM2 management      VTE Pharmacologic Prophylaxis: Sequential compression device (Venodyne)   VTE Mechanical Prophylaxis: sequential compression device    CHIEF COMPLAINT     Chief Complaint   Patient presents with    Chest Pain     CP and heaviness started yesterday after running out of lisinopril and ASA denies Sob N/D      HISTORY OF PRESENT ILLNESS     Tegan Trent is a 66 yo F presenting to the ED with a 1 5 day history of dizziness,  L sided chest pain, and nausea which resolved with administration of Lisinopril in the ED  She states that she hs been compliant with diet and medications however she ran out of her Lisinopril yesterday so was not able to take this morning  Pain was left sided pressure which did not radiate, she noted no exacerbatory or palliative factors and specifically denied increased pain with activity  She states she walks daily for exercise and has not noticed any intolerance  She denies any emesis, headache, fever/chills, diaphoresis, numbness and tingling, cough, SOB/SUE, PND, orthopnea, edema, polyuria or other changes  She denies current blurry vision but says she has experience intermittent episodes for the past week  On exam lungs were clear with no crackles or wheezing, heart sounds were normal with no murmurs, rubs, or gallops  No edema was noted  REVIEW OF SYSTEMS     Review of Systems   Constitutional: Positive for fatigue  Negative for chills, diaphoresis and unexpected weight change  Eyes: Positive for visual disturbance ( intermittent blurriness)  Negative for discharge     Respiratory: Negative for apnea, cough and shortness of breath  Cardiovascular: Positive for chest pain ( tightness on left side with no radiation)  Gastrointestinal: Positive for nausea ( currently resolved)  Negative for abdominal pain and vomiting  Endocrine: Negative for polyuria  Genitourinary: Negative for decreased urine volume, frequency and pelvic pain  Musculoskeletal: Positive for arthralgias ( pain in toes and ball of foot with exercise)  Negative for back pain  Neurological: Positive for dizziness (past 1 5 days, currently resolved)  Negative for weakness and headaches  OBJECTIVE     Vitals:    22 1027 22 1115 22 1230 22 1600   BP: (!) 182/98 (!) 205/93 169/87 (!) 176/85   BP Location: Left arm      Pulse: 70 68 56 64   Resp: 16 18 15 18   Temp: 98 3 °F (36 8 °C)      TempSrc: Temporal      SpO2: 97% 98% 97% 93%      Temperature:   Temp (24hrs), Av 3 °F (36 8 °C), Min:98 3 °F (36 8 °C), Max:98 3 °F (36 8 °C)    Temperature: 98 3 °F (36 8 °C)  Intake & Output:  I/O     None        Weights: There is no height or weight on file to calculate BMI  Weight (last 2 days)     None        Physical Exam  Vitals and nursing note reviewed  Constitutional:       Appearance: Normal appearance  She is not ill-appearing or diaphoretic  HENT:      Right Ear: External ear normal       Left Ear: External ear normal       Nose: No congestion or rhinorrhea  Mouth/Throat:      Mouth: Mucous membranes are moist       Pharynx: Oropharynx is clear  No oropharyngeal exudate or posterior oropharyngeal erythema  Eyes:      General: No scleral icterus  Conjunctiva/sclera: Conjunctivae normal    Cardiovascular:      Rate and Rhythm: Normal rate and regular rhythm  Pulses: Normal pulses  Heart sounds: Normal heart sounds  No murmur heard  No friction rub  Pulmonary:      Effort: Pulmonary effort is normal  No respiratory distress  Breath sounds: Normal breath sounds  No wheezing  Chest:      Chest wall: No tenderness  Abdominal:      General: Abdomen is flat  There is no distension  Palpations: Abdomen is soft  Musculoskeletal:      Right lower leg: No edema  Left lower leg: No edema  Skin:     General: Skin is warm and dry  Findings: No rash  Neurological:      General: No focal deficit present  Mental Status: She is alert and oriented to person, place, and time  Psychiatric:         Mood and Affect: Mood normal          Behavior: Behavior normal        PAST MEDICAL HISTORY     Past Medical History:   Diagnosis Date    Diabetes mellitus ([address] )     Hypertension      PAST SURGICAL HISTORY     Past Surgical History:   Procedure Laterality Date     SECTION       SOCIAL & FAMILY HISTORY     Social History     Substance and Sexual Activity   Alcohol Use Never     Substance and Sexual Activity   Alcohol Use Never        Substance and Sexual Activity   Drug Use Never     Social History     Tobacco Use   Smoking Status Never Smoker   Smokeless Tobacco Never Used     History reviewed  No pertinent family history  LABORATORY DATA     Labs: I have personally reviewed pertinent reports  Results from last 7 days   Lab Units 22  1132   WBC Thousand/uL 9 98   HEMOGLOBIN g/dL 14 0   HEMATOCRIT % 42 6   PLATELETS Thousands/uL 251   NEUTROS PCT % 62   MONOS PCT % 6      Results from last 7 days   Lab Units 22  1132   POTASSIUM mmol/L 3 8   CHLORIDE mmol/L 104   CO2 mmol/L 28   BUN mg/dL 14   CREATININE mg/dL 1 16   CALCIUM mg/dL 9 6   ALK PHOS U/L 91   ALT U/L 19   AST U/L 34                          Micro:  No results found for: BLOODCX, URINECX, WOUNDCULT, SPUTUMCULTUR  IMAGING & DIAGNOSTIC TESTS     Imaging: I have personally reviewed pertinent reports  XR chest 1 view portable    Result Date: 6/3/2022  Impression: No acute cardiopulmonary disease   Findings are stable Workstation performed: [id number]     EKG, Pathology, and Other Studies: I have personally reviewed pertinent reports  ALLERGIES     Allergies   Allergen Reactions    Aspirin Other (See Comments)    Caffeine - Food Allergy Other (See Comments)    Penicillins Other (See Comments)     MEDICATIONS PRIOR TO ARRIVAL     Prior to Admission medications    Medication Sig Start Date End Date Taking?  Authorizing Provider   acetaminophen (TYLENOL) 500 mg tablet Take 2 tablets (1,000 mg total) by mouth every 6 (six) hours as needed for mild pain 4/12/21   Lex Knowles PA-C   amLODIPine (NORVASC) 10 mg tablet Take 1 tablet (10 mg total) by mouth daily 4/20/21   Ben Villagran MD   aspirin 81 mg chewable tablet Chew 81 mg daily    Historical Provider, MD   atorvastatin (LIPITOR) 20 mg tablet Take 20 mg by mouth daily    Historical Provider, MD   cyclobenzaprine (FLEXERIL) 10 mg tablet Take 0 5 tablets (5 mg total) by mouth 2 (two) times a day as needed for muscle spasms 11/17/21   Bridget Johnson MD   cyclobenzaprine (FLEXERIL) 10 mg tablet Take 1 tablet (10 mg total) by mouth 2 (two) times a day as needed for muscle spasms for up to 3 days 12/5/21 12/8/21  Kam Starks,    diphenhydrAMINE (BENADRYL) 25 mg tablet Take 1 tablet (25 mg total) by mouth every 6 (six) hours as needed for itching 3/6/21   Tre Hartman PA-C   lidocaine (LIDODERM) 5 % Apply 1 patch topically daily for 4 days Remove & Discard patch within 12 hours or as directed by MD 12/5/21 12/9/21  Kam Starks,    lisinopril (ZESTRIL) 20 mg tablet Take 1 tablet (20 mg total) by mouth daily 6/21/21   Priscilla Byers,    metFORMIN (GLUCOPHAGE) 1000 MG tablet Take 1,000 mg by mouth 3/12/21 3/12/22  Historical Provider, MD   mupirocin (BACTROBAN) 2 % ointment Apply topically 3 (three) times a day 1/17/21   Lex Knowles PA-C   polymyxin b-trimethoprim (POLYTRIM) ophthalmic solution Administer 1 drop to both eyes every 4 (four) hours 1/17/21   Lex Knowles PA-C     MEDICATIONS ADMINISTERED IN LAST 24 HOURS     Medication Administration - last 24 hours from 06/02/2022 1637 to 06/03/2022 1637       Date/Time Order Dose Route Action Action by     06/03/2022 1132 nitroglycerin (NITROSTAT) SL tablet 0 4 mg 0 4 mg Sublingual Given Art Alvarez RN     06/03/2022 1256 lisinopril (ZESTRIL) tablet 20 mg 20 mg Oral Given Art Alvarez RN     06/03/2022 1429 aspirin chewable tablet 324 mg 324 mg Oral Given Art Alvarez RN     06/03/2022 1612 hydrALAZINE (APRESOLINE) injection 10 mg 10 mg Intravenous Given Art Alvarez RN     06/03/2022 1505 hydrALAZINE (APRESOLINE) tablet 25 mg   Oral Canceled Entry Art Alvarez RN     06/03/2022 1505 amLODIPine (NORVASC) tablet 5 mg   Oral Canceled Entry Art Alvarez RN        CURRENT MEDICATIONS     Current Facility-Administered Medications   Medication Dose Route Frequency Provider Last Rate    amLODIPine  10 mg Oral Daily MD Low Us [START ON 6/4/2022] aspirin  81 mg Oral Daily Wiliam Shaikh MD      atorvastatin  40 mg Oral Daily Wiliam Shaikh MD      hydrALAZINE  10 mg Intravenous Q6H PRN Valerie Vail MD      insulin lispro  1-5 Units Subcutaneous TID AC Wiliam Shaikh MD      insulin lispro  1-5 Units Subcutaneous HS MD Low Us [START ON 6/4/2022] lisinopril  20 mg Oral Daily Wiliam Shaikh MD      metoprolol tartrate  6 25 mg Oral Q12H [address] Wiliam Shaikh MD      nitroglycerin  0 4 mg Sublingual Q5 Min PRN Wiliam Shaikh MD          hydrALAZINE, 10 mg, Q6H PRN  nitroglycerin, 0 4 mg, Q5 Min PRN        Admission Time  I spent 45 minutes admitting the patient  This involved direct patient contact where I performed a full history and physical, reviewing previous records, and reviewing laboratory and other diagnostic studies  Portions of the record may have been created with voice recognition software    Occasional wrong word or "sound a like" substitutions may have occurred due to the inherent limitations of voice recognition software    Read the chart carefully and recognize, using context, where substitutions have occurred     ==  Subhash Araujo

## 2022-06-03 NOTE — ASSESSMENT & PLAN NOTE
Patient presents to ED with a 1 5 day history of  left sided, constant chest pain unrelated to activity and without radiation  Patient reports last dose of Lisinopril was yesterday and she was unable to take  today as her prescription ran out  Patient endorses complete resolution of symptoms following administration of nitroglycerin, aspirin, and lisinopril in the ED  EKG shows nonspecific changes likely in the context of hypertensive urgency,  HS Troponin negative x1  Unremarkable CXR with no acute cardiopulmonary disease  · NT-  · Troponin negative x3  · Continue home amlodipine 10mg daily, Lisinopril 20mg daily  · Telemetry with no events overnight  · Cardiology consulted, appreciate recommendations   · Nuclear study with no signs of ischemia or scar, ejection fraction 24%  · Per cardiology, patient stable for discharge

## 2022-06-03 NOTE — CONSULTS
Consultation - Cardiology Team One  Geovani Robles 67 y o  female MRN: [id number]  Unit/Bed#: ED 12 Encounter: [id number]    Inpatient consult to Cardiology  Consult performed by: Dariel Nguyen PA-C  Consult ordered by: Janelle Rodriguez MD          Physician Requesting Consult: Lynne Smith MD  Reason for Consult / Principal Problem: chest pain    Assessment:    1  Atypical Chest pain: Reports >24 hours of constant chest pain without improvement at rest or worsening with daily outdoor walk  HS troponin negative x 1  EKG with nonspecific changes  Likely in the setting of elevated BP as patient ran out of lisinopril  Now chest pain free with improvement in BP  2  Hypertensive urgency: /93 POA  Patient ran out of lisinopril at home 2 days ago  Received lisinopril 20 mg and SLN with improvement to 169/87  Also maintained on amlodipine 10 mg daily  3  Hyperlipidemia: No lipid panel on file  Maintained on atorvastatin 20 mg daily  4  Type 2 DM: Hgb A1C  Management per primary team     Plan/Recommendations:  · Follow up on nuclear stress test ordered by primary team  · Continue amlodipine and lisinopril  · Trend HS troponin x 3  · Stress diet  _____________________________________________________________________________________    CC: chest pain      History of Present Illness   HPI: Geovani Robles is a [place] citizen speaking 67y o  year old female who has essential hypertension, hyperlipidemia, type 2 DM presents to the ED at [address] [place] [address] 6/3/2022 complaints of chest pain that started yesterday after he ran out of lisinopril  On arrival to the ED BP was 205/93 with oxygen saturation 98% on RA  EKG revealed NSR with nonspecific ST-T wave abnormality  CXR with no acute abnormality  Labs revealed stable CMP, stable CBC, HS troponin negative x 1  Patient received 324 mg aspirin, 20 mg lisinopril and 1 SLN   A nuclear stress test was ordered and cardiology has been consulted for further evaluation of chest pain     Home medication regimen includes amlodipine 10 mg wes, aspirin 81 mg daily, atorvastatin 20 mg daily, lisinopril 20 mg daily  Patient resting in bed during consultation  An interpret was utilized for today's interview  Patient states she developed heavy chest pain yesterday that started yesterday morning  Her pain remained constant without improvement with rest  She went for her daily walk with friends without worsening of chest pain  As her pain remained constant into today she decided to get evaluated  Of note she ran out of lisinopril 2 days ago  Her pain went away around 1 pm       EKG reviewed personally: 6/3/2022- sinus rhythm at 69 bpm with nonspecific ST-TWA  Telemetry reviewed personally: normal sinus rhythm      Review of Systems   Constitutional: Negative  Negative for chills  Cardiovascular: Negative for chest pain, dyspnea on exertion, leg swelling, near-syncope, orthopnea, palpitations, paroxysmal nocturnal dyspnea and syncope  Respiratory: Negative  Negative for cough, shortness of breath and wheezing  Endocrine: Negative  Hematologic/Lymphatic: Negative  Skin: Negative  Musculoskeletal: Negative  Gastrointestinal: Negative  Negative for diarrhea, nausea and vomiting  Neurological: Negative for dizziness, light-headedness and weakness  Psychiatric/Behavioral: Negative  Negative for altered mental status  All other systems reviewed and are negative  Historical Information   Past Medical History:   Diagnosis Date    Diabetes mellitus ([facility] [address] )     Hypertension      Past Surgical History:   Procedure Laterality Date     SECTION       Social History     Substance and Sexual Activity   Alcohol Use Never     Social History     Substance and Sexual Activity   Drug Use Never     Social History     Tobacco Use   Smoking Status Never Smoker   Smokeless Tobacco Never Used     Family History: History reviewed  No pertinent family history      Meds/Allergies all current active meds have been reviewed, current meds:   Current Facility-Administered Medications   Medication Dose Route Frequency    amLODIPine (NORVASC) tablet 10 mg  10 mg Oral Daily    [START ON 6/4/2022] aspirin chewable tablet 81 mg  81 mg Oral Daily    atorvastatin (LIPITOR) tablet 40 mg  40 mg Oral Daily    hydrALAZINE (APRESOLINE) injection 10 mg  10 mg Intravenous Q6H PRN    hydrALAZINE (APRESOLINE) tablet 10 mg  10 mg Oral Q8H [facility]    insulin lispro (HumaLOG) 100 units/mL subcutaneous injection 1-5 Units  1-5 Units Subcutaneous TID AC    insulin lispro (HumaLOG) 100 units/mL subcutaneous injection 1-5 Units  1-5 Units Subcutaneous HS    [START ON 6/4/2022] lisinopril (ZESTRIL) tablet 20 mg  20 mg Oral Daily    nitroglycerin (NITROSTAT) SL tablet 0 4 mg  0 4 mg Sublingual Q5 Min PRN    and PTA meds:   Prior to Admission Medications   Prescriptions Last Dose Informant Patient Reported?  Taking?   acetaminophen (TYLENOL) 500 mg tablet   No No   Sig: Take 2 tablets (1,000 mg total) by mouth every 6 (six) hours as needed for mild pain   amLODIPine (NORVASC) 10 mg tablet   No No   Sig: Take 1 tablet (10 mg total) by mouth daily   aspirin 81 mg chewable tablet   Yes No   Sig: Chew 81 mg daily   atorvastatin (LIPITOR) 20 mg tablet   Yes No   Sig: Take 20 mg by mouth daily   cyclobenzaprine (FLEXERIL) 10 mg tablet   No No   Sig: Take 0 5 tablets (5 mg total) by mouth 2 (two) times a day as needed for muscle spasms   cyclobenzaprine (FLEXERIL) 10 mg tablet   No No   Sig: Take 1 tablet (10 mg total) by mouth 2 (two) times a day as needed for muscle spasms for up to 3 days   diphenhydrAMINE (BENADRYL) 25 mg tablet   No No   Sig: Take 1 tablet (25 mg total) by mouth every 6 (six) hours as needed for itching   lidocaine (LIDODERM) 5 %   No No   Sig: Apply 1 patch topically daily for 4 days Remove & Discard patch within 12 hours or as directed by MD   lisinopril (ZESTRIL) 20 mg tablet   No No   Sig: Take 1 tablet (20 mg total) by mouth daily   metFORMIN (GLUCOPHAGE) 1000 MG tablet   Yes No   Sig: Take 1,000 mg by mouth   mupirocin (BACTROBAN) 2 % ointment   No No   Sig: Apply topically 3 (three) times a day   polymyxin b-trimethoprim (POLYTRIM) ophthalmic solution   No No   Sig: Administer 1 drop to both eyes every 4 (four) hours      Facility-Administered Medications: None          Allergies   Allergen Reactions    Aspirin Other (See Comments)    Caffeine - Food Allergy Other (See Comments)    Penicillins Other (See Comments)       Objective   Vitals: Blood pressure 169/87, pulse 56, temperature 98 3 °F (36 8 °C), temperature source Temporal, resp  rate 15, SpO2 97 %  ,     There is no height or weight on file to calculate BMI ,     Systolic (72MOP), VCY:618 , Min:169 , FEY:403     Diastolic (08CSX), TPF:45, Min:87, Max:98    Wt Readings from Last 3 Encounters:   12/05/21 55 kg (121 lb 4 1 oz)   11/17/21 55 5 kg (122 lb 5 7 oz)   06/21/21 49 kg (108 lb 0 4 oz)      Lab Results   Component Value Date    CREATININE 1 16 06/03/2022    CREATININE 0 78 12/05/2021    CREATININE 0 78 04/20/2021           No intake or output data in the 24 hours ending 06/03/22 1508  Weight (last 2 days)     None        Invasive Devices  Report    Peripheral Intravenous Line  Duration           Peripheral IV 06/03/22 Left Hand <1 day                  Physical Exam  Vitals and nursing note reviewed  Constitutional:       General: She is not in acute distress  Appearance: She is well-developed  Comments: On RA in NAD   HENT:      Head: Normocephalic and atraumatic  Neck:      Vascular: No JVD  Cardiovascular:      Rate and Rhythm: Normal rate and regular rhythm  Heart sounds: Normal heart sounds  No murmur heard  No friction rub  Pulmonary:      Effort: Pulmonary effort is normal  No respiratory distress  Breath sounds: Normal breath sounds  No wheezing or rales     Abdominal:      General: Bowel sounds are normal  There is no distension  Palpations: Abdomen is soft  Tenderness: There is no abdominal tenderness  Musculoskeletal:         General: No tenderness  Normal range of motion  Cervical back: Normal range of motion and neck supple  Right lower leg: No edema  Left lower leg: No edema  Skin:     General: Skin is warm and dry  Findings: No erythema  Neurological:      Mental Status: She is alert and oriented to person, place, and time  Psychiatric:         Mood and Affect: Mood normal          Behavior: Behavior normal          Thought Content: Thought content normal          Judgment: Judgment normal            LABORATORY RESULTS:      CBC with diff:   Results from last 7 days   Lab Units 06/03/22  1132   WBC Thousand/uL 9 98   HEMOGLOBIN g/dL 14 0   HEMATOCRIT % 42 6   MCV fL 92   PLATELETS Thousands/uL 251   MCH pg 30 2   MCHC g/dL 32 9   RDW % 12 6   MPV fL 10 5   NRBC AUTO /100 WBCs 0       CMP:  Results from last 7 days   Lab Units 06/03/22  1132   POTASSIUM mmol/L 3 8   CHLORIDE mmol/L 104   CO2 mmol/L 28   BUN mg/dL 14   CREATININE mg/dL 1 16   CALCIUM mg/dL 9 6   AST U/L 34   ALT U/L 19   ALK PHOS U/L 91   EGFR ml/min/1 73sq m 47       BMP:  Results from last 7 days   Lab Units 06/03/22  1132   POTASSIUM mmol/L 3 8   CHLORIDE mmol/L 104   CO2 mmol/L 28   BUN mg/dL 14   CREATININE mg/dL 1 16   CALCIUM mg/dL 9 6          Lab Results   Component Value Date    NTBNP 181 12/05/2021                                  Lipid Profile:   No results found for: CHOL  No results found for: HDL  No results found for: LDLCALC  No results found for: TRIG      Cardiac testing:   No results found for this or any previous visit  No results found for this or any previous visit  No valid procedures specified  No results found for this or any previous visit  Imaging: I have personally reviewed pertinent reports      XR chest 1 view portable    Result Date: 6/3/2022  Narrative: CHEST INDICATION:   CP  COMPARISON:  12/5/2021 EXAM PERFORMED/VIEWS:  XR CHEST PORTABLE Single view FINDINGS: Cardiomediastinal silhouette appears unremarkable  The lungs are clear  No pneumothorax or pleural effusion  Osseous structures appear within normal limits for patient age  Impression: No acute cardiopulmonary disease  Findings are stable Workstation performed: [id number]           Counseling / Coordination of Care  Total floor / unit time spent today 45 minutes  Greater than 50% of total time was spent with the patient and / or family counseling and / or coordination of care  A description of the counseling / coordination of care: Review of history, current assessment, development of a plan  Code Status: Level 1 - Full Code    ** Please Note: Dragon 360 Dictation voice to text software may have been used in the creation of this document   **

## 2022-06-03 NOTE — ASSESSMENT & PLAN NOTE
Patient has PMH of hypertension, DM2 and dyslipidemia  She presented to the ED today with a BP of 205/93  Patient states she ran out of her lisinopril yesterday and has not taken it today, patient denies recent changes to or non-adherence with diet  She notes 1 5 days of dizziness and chest pain which resolved several hours following administration of lisinopril and nitroglycerine in the ED  Subsequent BP checks showed 182/98 and 169/87  · Continue to monitor BP  · Continue home medications of Amlodipine 10mg daily, Lisinopril 20mg and Lopressor switched to carvedilol 3 125mg BID at discharge    6/4/2022- Req'd Labetolol 10mg overnight   BP stable       Continue current regimen       Goal <130 / 80

## 2022-06-04 ENCOUNTER — APPOINTMENT (INPATIENT)
Dept: RADIOLOGY | Facility: HOSPITAL | Age: 72
DRG: 305 | End: 2022-06-04
Payer: MEDICARE

## 2022-06-04 ENCOUNTER — APPOINTMENT (INPATIENT)
Dept: NON INVASIVE DIAGNOSTICS | Facility: HOSPITAL | Age: 72
DRG: 305 | End: 2022-06-04
Payer: MEDICARE

## 2022-06-04 VITALS
DIASTOLIC BLOOD PRESSURE: 74 MMHG | OXYGEN SATURATION: 98 % | HEART RATE: 76 BPM | RESPIRATION RATE: 18 BRPM | SYSTOLIC BLOOD PRESSURE: 152 MMHG | TEMPERATURE: 99 F

## 2022-06-04 PROBLEM — E78.1 HYPERTRIGLYCERIDEMIA: Status: ACTIVE | Noted: 2022-06-04

## 2022-06-04 PROBLEM — E78.5 HLD (HYPERLIPIDEMIA): Status: ACTIVE | Noted: 2022-06-04

## 2022-06-04 LAB
ANION GAP SERPL CALCULATED.3IONS-SCNC: 10 MMOL/L (ref 4–13)
BASOPHILS # BLD AUTO: 0.07 THOUSANDS/ΜL (ref 0–0.1)
BASOPHILS NFR BLD AUTO: 1 % (ref 0–1)
BUN SERPL-MCNC: 14 MG/DL (ref 5–25)
CALCIUM SERPL-MCNC: 9.1 MG/DL (ref 8.3–10.1)
CHLORIDE SERPL-SCNC: 103 MMOL/L (ref 100–108)
CHOLEST SERPL-MCNC: 208 MG/DL
CO2 SERPL-SCNC: 27 MMOL/L (ref 21–32)
CREAT SERPL-MCNC: 0.84 MG/DL (ref 0.6–1.3)
EOSINOPHIL # BLD AUTO: 0.31 THOUSAND/ΜL (ref 0–0.61)
EOSINOPHIL NFR BLD AUTO: 3 % (ref 0–6)
ERYTHROCYTE [DISTWIDTH] IN BLOOD BY AUTOMATED COUNT: 12.6 % (ref 11.6–15.1)
FLUAV RNA RESP QL NAA+PROBE: NEGATIVE
FLUBV RNA RESP QL NAA+PROBE: NEGATIVE
GFR SERPL CREATININE-BSD FRML MDRD: 69 ML/MIN/1.73SQ M
GLUCOSE SERPL-MCNC: 139 MG/DL (ref 65–140)
GLUCOSE SERPL-MCNC: 144 MG/DL (ref 65–140)
GLUCOSE SERPL-MCNC: 157 MG/DL (ref 65–140)
GLUCOSE SERPL-MCNC: 169 MG/DL (ref 65–140)
GLUCOSE SERPL-MCNC: 206 MG/DL (ref 65–140)
HCT VFR BLD AUTO: 39.3 % (ref 34.8–46.1)
HDLC SERPL-MCNC: 32 MG/DL
HGB BLD-MCNC: 13.3 G/DL (ref 11.5–15.4)
IMM GRANULOCYTES # BLD AUTO: 0.06 THOUSAND/UL (ref 0–0.2)
IMM GRANULOCYTES NFR BLD AUTO: 1 % (ref 0–2)
LDLC SERPL DIRECT ASSAY-MCNC: 115 MG/DL (ref 0–100)
LYMPHOCYTES # BLD AUTO: 3.94 THOUSANDS/ΜL (ref 0.6–4.47)
LYMPHOCYTES NFR BLD AUTO: 36 % (ref 14–44)
MAX HR PERCENT: 77 %
MAX HR: 114 BPM
MCH RBC QN AUTO: 29.5 PG (ref 26.8–34.3)
MCHC RBC AUTO-ENTMCNC: 33.8 G/DL (ref 31.4–37.4)
MCV RBC AUTO: 87 FL (ref 82–98)
MONOCYTES # BLD AUTO: 0.74 THOUSAND/ΜL (ref 0.17–1.22)
MONOCYTES NFR BLD AUTO: 7 % (ref 4–12)
NEUTROPHILS # BLD AUTO: 5.99 THOUSANDS/ΜL (ref 1.85–7.62)
NEUTS SEG NFR BLD AUTO: 52 % (ref 43–75)
NRBC BLD AUTO-RTO: 0 /100 WBCS
NUC STRESS EJECTION FRACTION: 74 %
PLATELET # BLD AUTO: 254 THOUSANDS/UL (ref 149–390)
PMV BLD AUTO: 9.3 FL (ref 8.9–12.7)
POTASSIUM SERPL-SCNC: 3.3 MMOL/L (ref 3.5–5.3)
RATE PRESSURE PRODUCT: NORMAL
RBC # BLD AUTO: 4.51 MILLION/UL (ref 3.81–5.12)
RSV RNA RESP QL NAA+PROBE: NEGATIVE
SARS-COV-2 RNA RESP QL NAA+PROBE: NEGATIVE
SL CV REST NUCLEAR ISOTOPE DOSE: 10.4 MCI
SL CV STRESS NUCLEAR ISOTOPE DOSE: 30.8 MCI
SL CV STRESS RECOVERY BP: NORMAL MMHG
SL CV STRESS RECOVERY HR: 88 BPM
SL CV STRESS RECOVERY O2 SAT: 99 %
SODIUM SERPL-SCNC: 140 MMOL/L (ref 136–145)
STRESS BASELINE BP: NORMAL MMHG
STRESS BASELINE HR: 70 BPM
STRESS O2 SAT REST: 99 %
STRESS PEAK HR: 108 BPM
STRESS POST ESTIMATED WORKLOAD: 1.7 METS
STRESS POST EXERCISE DUR MIN: 3 MIN
STRESS POST EXERCISE DUR SEC: 0 SEC
STRESS POST O2 SAT PEAK: 99 %
STRESS POST PEAK BP: 142 MMHG
STRESS ST DEPRESSION: 0 MM
STRESS/REST PERFUSION RATIO: 1.14
TRIGL SERPL-MCNC: 461 MG/DL
WBC # BLD AUTO: 11.11 THOUSAND/UL (ref 4.31–10.16)

## 2022-06-04 PROCEDURE — G1004 CDSM NDSC: HCPCS

## 2022-06-04 PROCEDURE — 99232 SBSQ HOSP IP/OBS MODERATE 35: CPT | Performed by: INTERNAL MEDICINE

## 2022-06-04 PROCEDURE — 93018 CV STRESS TEST I&R ONLY: CPT | Performed by: INTERNAL MEDICINE

## 2022-06-04 PROCEDURE — 78452 HT MUSCLE IMAGE SPECT MULT: CPT | Performed by: INTERNAL MEDICINE

## 2022-06-04 PROCEDURE — 83721 ASSAY OF BLOOD LIPOPROTEIN: CPT | Performed by: STUDENT IN AN ORGANIZED HEALTH CARE EDUCATION/TRAINING PROGRAM

## 2022-06-04 PROCEDURE — 80048 BASIC METABOLIC PNL TOTAL CA: CPT | Performed by: STUDENT IN AN ORGANIZED HEALTH CARE EDUCATION/TRAINING PROGRAM

## 2022-06-04 PROCEDURE — 0241U HB NFCT DS VIR RESP RNA 4 TRGT: CPT | Performed by: STUDENT IN AN ORGANIZED HEALTH CARE EDUCATION/TRAINING PROGRAM

## 2022-06-04 PROCEDURE — 82948 REAGENT STRIP/BLOOD GLUCOSE: CPT

## 2022-06-04 PROCEDURE — 85025 COMPLETE CBC W/AUTO DIFF WBC: CPT | Performed by: STUDENT IN AN ORGANIZED HEALTH CARE EDUCATION/TRAINING PROGRAM

## 2022-06-04 PROCEDURE — 99222 1ST HOSP IP/OBS MODERATE 55: CPT | Performed by: INTERNAL MEDICINE

## 2022-06-04 PROCEDURE — A9502 TC99M TETROFOSMIN: HCPCS

## 2022-06-04 PROCEDURE — 93016 CV STRESS TEST SUPVJ ONLY: CPT | Performed by: INTERNAL MEDICINE

## 2022-06-04 PROCEDURE — 80061 LIPID PANEL: CPT | Performed by: STUDENT IN AN ORGANIZED HEALTH CARE EDUCATION/TRAINING PROGRAM

## 2022-06-04 PROCEDURE — 93017 CV STRESS TEST TRACING ONLY: CPT

## 2022-06-04 PROCEDURE — 78452 HT MUSCLE IMAGE SPECT MULT: CPT

## 2022-06-04 PROCEDURE — NC001 PR NO CHARGE: Performed by: INTERNAL MEDICINE

## 2022-06-04 RX ORDER — AMINOPHYLLINE DIHYDRATE 25 MG/ML
INJECTION, SOLUTION INTRAVENOUS
Status: DISCONTINUED
Start: 2022-06-04 | End: 2022-06-04 | Stop reason: WASHOUT

## 2022-06-04 RX ORDER — LABETALOL HYDROCHLORIDE 5 MG/ML
10 INJECTION, SOLUTION INTRAVENOUS EVERY 6 HOURS PRN
Status: DISCONTINUED | OUTPATIENT
Start: 2022-06-04 | End: 2022-06-04 | Stop reason: HOSPADM

## 2022-06-04 RX ORDER — HYDRALAZINE HYDROCHLORIDE 20 MG/ML
10 INJECTION INTRAMUSCULAR; INTRAVENOUS EVERY 6 HOURS PRN
Status: DISCONTINUED | OUTPATIENT
Start: 2022-06-04 | End: 2022-06-04 | Stop reason: HOSPADM

## 2022-06-04 RX ORDER — POTASSIUM CHLORIDE 20 MEQ/1
40 TABLET, EXTENDED RELEASE ORAL 2 TIMES DAILY
Status: COMPLETED | OUTPATIENT
Start: 2022-06-04 | End: 2022-06-04

## 2022-06-04 RX ORDER — CARVEDILOL 3.12 MG/1
3.12 TABLET ORAL 2 TIMES DAILY WITH MEALS
Qty: 60 TABLET | Refills: 0 | Status: SHIPPED | OUTPATIENT
Start: 2022-06-04 | End: 2022-07-04

## 2022-06-04 RX ADMIN — LISINOPRIL 20 MG: 20 TABLET ORAL at 09:33

## 2022-06-04 RX ADMIN — ASPIRIN 81 MG CHEWABLE TABLET 81 MG: 81 TABLET CHEWABLE at 09:33

## 2022-06-04 RX ADMIN — AMLODIPINE BESYLATE 10 MG: 10 TABLET ORAL at 09:33

## 2022-06-04 RX ADMIN — INSULIN LISPRO 1 UNITS: 100 INJECTION, SOLUTION INTRAVENOUS; SUBCUTANEOUS at 17:23

## 2022-06-04 RX ADMIN — HEPARIN SODIUM 5000 UNITS: 5000 INJECTION INTRAVENOUS; SUBCUTANEOUS at 05:13

## 2022-06-04 RX ADMIN — ATORVASTATIN CALCIUM 40 MG: 40 TABLET, FILM COATED ORAL at 17:21

## 2022-06-04 RX ADMIN — REGADENOSON 0.4 MG: 0.08 INJECTION, SOLUTION INTRAVENOUS at 11:39

## 2022-06-04 RX ADMIN — POTASSIUM CHLORIDE 40 MEQ: 1500 TABLET, EXTENDED RELEASE ORAL at 17:21

## 2022-06-04 RX ADMIN — HEPARIN SODIUM 5000 UNITS: 5000 INJECTION INTRAVENOUS; SUBCUTANEOUS at 13:21

## 2022-06-04 RX ADMIN — Medication 6.25 MG: at 13:22

## 2022-06-04 RX ADMIN — POTASSIUM CHLORIDE 40 MEQ: 1500 TABLET, EXTENDED RELEASE ORAL at 07:03

## 2022-06-04 NOTE — ASSESSMENT & PLAN NOTE
TGs in 400s  Not a fasting blood test  Can repeat out patient     Will hold onto any treatment for now  Continue Lipitor 40mg

## 2022-06-04 NOTE — PLAN OF CARE
Problem: PAIN - ADULT  Goal: Verbalizes/displays adequate comfort level or baseline comfort level  Description: Interventions:  - Encourage patient to monitor pain and request assistance  - Assess pain using appropriate pain scale  - Administer analgesics based on type and severity of pain and evaluate response  - Implement non-pharmacological measures as appropriate and evaluate response  - Consider cultural and social influences on pain and pain management  - Notify physician/advanced practitioner if interventions unsuccessful or patient reports new pain  Outcome: Progressing     Problem: INFECTION - ADULT  Goal: Absence or prevention of progression during hospitalization  Description: INTERVENTIONS:  - Assess and monitor for signs and symptoms of infection  - Monitor lab/diagnostic results  - Monitor all insertion sites, i e  indwelling lines, tubes, and drains  - Monitor endotracheal if appropriate and nasal secretions for changes in amount and color  - [place] appropriate cooling/warming therapies per order  - Administer medications as ordered  - Instruct and encourage patient and family to use good hand hygiene technique  - Identify and instruct in appropriate isolation precautions for identified infection/condition  Outcome: Progressing  Goal: Absence of fever/infection during neutropenic period  Description: INTERVENTIONS:  - Monitor WBC    Outcome: Progressing     Problem: SAFETY ADULT  Goal: Patient will remain free of falls  Description: INTERVENTIONS:  - Educate patient/family on patient safety including physical limitations  - Instruct patient to call for assistance with activity   - Consult OT/PT to assist with strengthening/mobility   - Keep Call bell within reach  - Keep bed low and locked with side rails adjusted as appropriate  - Keep care items and personal belongings within reach  - Initiate and maintain comfort rounds  - Make Fall Risk Sign visible to staff  - Offer Toileting every 2 Hours, in advance of need  - Initiate/Maintain alarm  - Obtain necessary fall risk management equipment  - Apply yellow socks and bracelet for high fall risk patients  - Consider moving patient to room near nurses station  Outcome: Progressing  Goal: Maintain or return to baseline ADL function  Description: INTERVENTIONS:  -  Assess patient's ability to carry out ADLs; assess patient's baseline for ADL function and identify physical deficits which impact ability to perform ADLs (bathing, care of mouth/teeth, toileting, grooming, dressing, etc )  - Assess/evaluate cause of self-care deficits   - Assess range of motion  - Assess patient's mobility; develop plan if impaired  - Assess patient's need for assistive devices and provide as appropriate  - Encourage maximum independence but intervene and supervise when necessary  - Involve family in performance of ADLs  - Assess for home care needs following discharge   - Consider OT consult to assist with ADL evaluation and planning for discharge  - Provide patient education as appropriate  Outcome: Progressing  Goal: Maintains/Returns to pre admission functional level  Description: INTERVENTIONS:  - Perform BMAT or MOVE assessment daily    - Set and communicate daily mobility goal to care team and patient/family/caregiver  - Collaborate with rehabilitation services on mobility goals if consulted  - Perform Range of Motion 3 times a day  - Reposition patient every 2 hours    - Dangle patient 3 times a day  - Stand patient 3 times a day  - Ambulate patient 3 times a day  - Out of bed to chair 3 times a day   - Out of bed for meals 3 times a day  - Out of bed for toileting  - Record patient progress and toleration of activity level   Outcome: Progressing     Problem: DISCHARGE PLANNING  Goal: Discharge to home or other facility with appropriate resources  Description: INTERVENTIONS:  - Identify barriers to discharge w/patient and caregiver  - Arrange for needed discharge resources and transportation as appropriate  - Identify discharge learning needs (meds, wound care, etc )  - Arrange for interpretive services to assist at discharge as needed  - Refer to Case Management Department for coordinating discharge planning if the patient needs post-hospital services based on physician/advanced practitioner order or complex needs related to functional status, cognitive ability, or social support system  Outcome: Progressing     Problem: Knowledge Deficit  Goal: Patient/family/caregiver demonstrates understanding of disease process, treatment plan, medications, and discharge instructions  Description: Complete learning assessment and assess knowledge base    Interventions:  - Provide teaching at level of understanding  - Provide teaching via preferred learning methods  Outcome: Progressing

## 2022-06-04 NOTE — DISCHARGE SUMMARY
INTERNAL MEDICINE RESIDENCY DISCHARGE SUMMARY     Sharif Martinez   67 y o  female  MRN: [id number]  [facility]     [facility]    Encounter: [id number]    Principal Problem:    Hypertensive urgency  Active Problems:    Diabetes mellitus ([address] )    Chest pain, atypical    Hypertriglyceridemia    HLD (hyperlipidemia)      HLD (hyperlipidemia)  Assessment & Plan    With DM goal <70  Increased Lipitor 20mg to 40mg      Hypertriglyceridemia  Assessment & Plan  TGs in 400s  Not a fasting blood test  Can repeat out patient     Will hold onto any treatment for now  Continue Lipitor 40mg  Chest pain, atypical  Assessment & Plan  Patient presents to ED with a 1 5 day history of  left sided, constant chest pain unrelated to activity and without radiation  Patient reports last dose of Lisinopril was yesterday and she was unable to take  today as her prescription ran out  Patient endorses complete resolution of symptoms following administration of nitroglycerin, aspirin, and lisinopril in the ED  EKG shows nonspecific changes likely in the context of hypertensive urgency,  HS Troponin negative x1  Unremarkable CXR with no acute cardiopulmonary disease  · NT-  · Troponin negative x3  · Continue home amlodipine 10mg daily, Lisinopril 20mg daily  · Telemetry with no events overnight  · Cardiology consulted, appreciate recommendations   · Nuclear study with no signs of ischemia or scar, ejection fraction 24%  · Per cardiology, patient stable for discharge  Diabetes mellitus [facility])  Assessment & Plan  Patient has PMH of Type 2 DM  Blood glucose on admission 251, BG 6 months prior 222    · HBA1c- 6 9  · Will resume home Metformin at DC  · F/u outpatient PCP for DM2 management    * Hypertensive urgency  Assessment & Plan  Patient has PMH of hypertension, DM2 and dyslipidemia  She presented to the ED today with a BP of 205/93    Patient states she ran out of her lisinopril yesterday and has not taken it today, patient denies recent changes to or non-adherence with diet  She notes 1 5 days of dizziness and chest pain which resolved several hours following administration of lisinopril and nitroglycerine in the ED  Subsequent BP checks showed 182/98 and 169/87  · Continue to monitor BP  · Continue home medications of Amlodipine 10mg daily, Lisinopril 20mg and Lopressor switched to carvedilol 3 125mg BID at discharge    6/4/2022- Req'd Labetolol 10mg overnight  BP stable       Continue current regimen       Goal <130 / [facility]     HPI by Dr Brenda Tian: "Pt is a 66 y/o F w/ PMHx HTN, HLD, DM who presented to the ED with 2 day hx of atypical left sided chest pain  Denies association with activity  No hx of CAD or MI  Used to follow up with PCP but the provider left the country per patient  She ran out of her medications  She was hypertensive to 200s in the ED with improvement with lisinopril 20mg given in the ED and NTG   4  Currently she is pain free  Feels she is back to baseline  Does have intermittent blurry vision but not at this time  On exam lungs were clear with no crackles or wheezing, heart sounds were normal with no murmurs, rubs, or gallops  No edema was noted "    Admission chest x-ray with no acute cardiopulmonary disease  EKG with nonspecific T-waves  Troponins negative x3  Patient evaluated by Cardiology which recommended nuclear stress test   Patient was monitor on telemetry overnight  Telemetry overnight events overnight  This morning patient was chest pain-free and no shortness of breath  Cardiac stress test with no signs of ischemia or scar and normal ejection fraction 74%  Per Cardiology, patient medically stable for discharge on pharmacotherapy  Per Cardiology recommendations, patient switched from metoprolol to carvedilol at discharge    At the time of discharge, family at bedside, updated on workup results and further plan  DISCHARGE INFORMATION     PCP at Discharge: Not on file    Admitting Provider: Florentin Richard MD  Admission Date: 6/3/2022    Discharge Provider: No att  providers found  Discharge Date: 06/04/2022    Discharge Disposition: Home/Self Care  Discharge Condition: good  Discharge with Lines: no    Discharge Diet: regular diet  Activity Restrictions: none  Test Results Pending at Discharge: none    Discharge Diagnoses:  Principal Problem:    Hypertensive urgency  Active Problems:    Diabetes mellitus ([address] )    Chest pain, atypical    Hypertriglyceridemia    HLD (hyperlipidemia)  Resolved Problems:    * No resolved hospital problems  *      Consulting Providers:      Diagnostic & Therapeutic Procedures Performed:  XR chest 1 view portable    Result Date: 6/3/2022  Impression: No acute cardiopulmonary disease   Findings are stable Workstation performed: [id number]       Code Status: Level 1 - Full Code  Advance Directive & Living Will: <no information>  Power of :    POLST:      Medications:  Current Discharge Medication List        Current Discharge Medication List      START taking these medications    Details   carvedilol (COREG) 3 125 mg tablet Take 1 tablet (3 125 mg total) by mouth 2 (two) times a day with meals  Qty: 60 tablet, Refills: 0    Associated Diagnoses: Hypertensive urgency           Current Discharge Medication List      CONTINUE these medications which have NOT CHANGED    Details   acetaminophen (TYLENOL) 500 mg tablet Take 2 tablets (1,000 mg total) by mouth every 6 (six) hours as needed for mild pain  Qty: 30 tablet, Refills: 0    Associated Diagnoses: Urinary tract infection      amLODIPine (NORVASC) 10 mg tablet Take 1 tablet (10 mg total) by mouth daily  Qty: 30 tablet, Refills: 0    Associated Diagnoses: Hypertensive urgency      aspirin 81 mg chewable tablet Chew 81 mg daily      atorvastatin (LIPITOR) 20 mg tablet Take 20 mg by mouth daily cyclobenzaprine (FLEXERIL) 10 mg tablet Take 0 5 tablets (5 mg total) by mouth 2 (two) times a day as needed for muscle spasms  Qty: 20 tablet, Refills: 0    Associated Diagnoses: Neck pain      diphenhydrAMINE (BENADRYL) 25 mg tablet Take 1 tablet (25 mg total) by mouth every 6 (six) hours as needed for itching  Qty: 30 tablet, Refills: 0    Associated Diagnoses: Angioedema, initial encounter      lidocaine (LIDODERM) 5 % Apply 1 patch topically daily for 4 days Remove & Discard patch within 12 hours or as directed by MD  Qty: 4 patch, Refills: 0    Associated Diagnoses: Neck pain; Strain of neck muscle, initial encounter; Fall, initial encounter      lisinopril (ZESTRIL) 20 mg tablet Take 1 tablet (20 mg total) by mouth daily  Qty: 30 tablet, Refills: 0    Associated Diagnoses: Medication refill      metFORMIN (GLUCOPHAGE) 1000 MG tablet Take 1,000 mg by mouth      mupirocin (BACTROBAN) 2 % ointment Apply topically 3 (three) times a day  Qty: 15 g, Refills: 0    Associated Diagnoses: Rash and nonspecific skin eruption      polymyxin b-trimethoprim (POLYTRIM) ophthalmic solution Administer 1 drop to both eyes every 4 (four) hours  Qty: 10 mL, Refills: 0    Associated Diagnoses: Conjunctivitis of both eyes, unspecified conjunctivitis type             Allergies: Allergies   Allergen Reactions    Caffeine - Food Allergy Other (See Comments)    Penicillins Other (See Comments)       FOLLOW-UP     PCP Outpatient Follow-up:  Recommended, patient will like to establish care with our office  Information given at discharge  Consulting Providers Follow-up:  none required     Active Issues Requiring Follow-up:   Hypertension, diabetes    Discharge Statement:   I spent 30 minutes minutes discharging the patient  This time was spent on the day of discharge  I had direct contact with the patient on the day of discharge  Additional documentation is required if more than 30 minutes were spent on discharge      Portions of the record may have been created with voice recognition software  Occasional wrong word or "sound a like" substitutions may have occurred due to the inherent limitations of voice recognition software    Read the chart carefully and recognize, using context, where substitutions have occurred     ==  Nikki Gleason MD  [address]  Internal Medicine Resident PGY-1

## 2022-06-04 NOTE — PROGRESS NOTES
General Cardiology   Progress Note -  [facility]   Sancho Larsen 67 y o  female MRN: [id number]    Unit/Bed#: [facility] 201-01 Encounter: [id number]    Assessment:    1  Atypical Chest pain: Reports >24 hours of constant chest pain without improvement at rest or worsening with daily outdoor walk  HS troponin negative x 2  EKG with nonspecific changes  Likely in the setting of elevated BP as patient ran out of lisinopril  Chest pain resolved with improvement in BP  Currently remains chest pain-free  2  Hypertensive urgency: /93 POA  Patient ran out of lisinopril at home 2 days ago  Received lisinopril 20 mg and SLN with improvement  Last /67  Currently on lisinopril 20 mg daily, amlodipine 10 mg daily and Lopressor 6 25 mg added yesterday for elevated blood pressure in the evening  3  Hyperlipidemia: No lipid panel on file  Maintained on atorvastatin 20 mg daily  4  Type 2 DM: Hgb A1C  Management per primary team     Plan/Recommendations:  · Follow up on nuclear stress test ordered by primary team  · Continue amlodipine and lisinopril  · Hold beta-blocker for stress test this morning  _____________________________________________________________________________________    Subjective    Patient seen and examined  No acute events overnight  Google translate was utilized for today's interview  She denies any chest pain, shortness of breath or palpitations  Review of Systems   Constitutional: Negative  Negative for chills  Cardiovascular: Negative for chest pain, dyspnea on exertion, leg swelling, near-syncope, orthopnea, palpitations, paroxysmal nocturnal dyspnea and syncope  Respiratory: Negative  Negative for cough, shortness of breath and wheezing  Endocrine: Negative  Hematologic/Lymphatic: Negative  Skin: Negative  Musculoskeletal: Negative  Gastrointestinal: Negative  Negative for diarrhea, nausea and vomiting     Neurological: Negative for dizziness, light-headedness and weakness  Psychiatric/Behavioral: Negative  Negative for altered mental status  All other systems reviewed and are negative  Objective:   Vitals: Blood pressure 129/67, pulse 66, temperature 98 °F (36 7 °C), temperature source Oral, resp  rate 18, SpO2 98 %  ,     Wt Readings from Last 3 Encounters:   12/05/21 55 kg (121 lb 4 1 oz)   11/17/21 55 5 kg (122 lb 5 7 oz)   06/21/21 49 kg (108 lb 0 4 oz)        Lab Results   Component Value Date    CREATININE 0 84 06/04/2022    CREATININE 1 16 06/03/2022    CREATININE 0 78 12/05/2021         There is no height or weight on file to calculate BMI ,     Systolic (72ZFB), QHS:033 , Min:129 , MAB:122     Diastolic (73IMH), LFY:12, Min:58, Max:109          Intake/Output Summary (Last 24 hours) at 6/4/2022 0842  Last data filed at 6/4/2022 4784  Gross per 24 hour   Intake 880 ml   Output --   Net 880 ml     Weight (last 2 days)     None            Telemetry Review: No significant arrhythmias seen on telemetry review  Physical Exam  Vitals and nursing note reviewed  Constitutional:       General: She is not in acute distress  Appearance: She is well-developed  Comments: On RA in NAD   HENT:      Head: Normocephalic and atraumatic  Neck:      Vascular: No JVD  Cardiovascular:      Rate and Rhythm: Normal rate and regular rhythm  Heart sounds: Normal heart sounds  No murmur heard  No friction rub  Pulmonary:      Effort: Pulmonary effort is normal  No respiratory distress  Breath sounds: Normal breath sounds  No wheezing or rales  Abdominal:      General: Bowel sounds are normal  There is no distension  Palpations: Abdomen is soft  Tenderness: There is no abdominal tenderness  Musculoskeletal:         General: No tenderness  Normal range of motion  Cervical back: Normal range of motion and neck supple  Right lower leg: No edema  Left lower leg: No edema  Skin:     General: Skin is warm and dry  Findings: No erythema  Neurological:      Mental Status: She is alert and oriented to person, place, and time  Psychiatric:         Mood and Affect: Mood normal          Behavior: Behavior normal          Thought Content: Thought content normal          Judgment: Judgment normal          LABORATORY RESULTS      CBC with diff:   Results from last 7 days   Lab Units 06/04/22  0507 06/03/22  1132   WBC Thousand/uL 11 11* 9 98   HEMOGLOBIN g/dL 13 3 14 0   HEMATOCRIT % 39 3 42 6   MCV fL 87 92   PLATELETS Thousands/uL 254 251   MCH pg 29 5 30 2   MCHC g/dL 33 8 32 9   RDW % 12 6 12 6   MPV fL 9 3 10 5   NRBC AUTO /100 WBCs 0 0       CMP:  Results from last 7 days   Lab Units 06/04/22  0507 06/03/22  1132   POTASSIUM mmol/L 3 3* 3 8   CHLORIDE mmol/L 103 104   CO2 mmol/L 27 28   BUN mg/dL 14 14   CREATININE mg/dL 0 84 1 16   CALCIUM mg/dL 9 1 9 6   AST U/L  --  34   ALT U/L  --  19   ALK PHOS U/L  --  91   EGFR ml/min/1 73sq m 69 47       BMP:  Results from last 7 days   Lab Units 06/04/22  0507 06/03/22  1132   POTASSIUM mmol/L 3 3* 3 8   CHLORIDE mmol/L 103 104   CO2 mmol/L 27 28   BUN mg/dL 14 14   CREATININE mg/dL 0 84 1 16   CALCIUM mg/dL 9 1 9 6       Lab Results   Component Value Date    NTBNP 183 (H) 06/03/2022    NTBNP 181 12/05/2021                    Results from last 7 days   Lab Units 06/03/22  1548   HEMOGLOBIN A1C % 6 9*                    Lipid Profile:   No results found for: CHOL  Lab Results   Component Value Date    HDL 32 (L) 06/04/2022     Lab Results   Component Value Date    [facility]  06/04/2022      Comment:      Calculated LDL invalid, triglycerides >400 mg/dl     Lab Results   Component Value Date    TRIG 461 (H) 06/04/2022       Cardiac testing:   No results found for this or any previous visit  No results found for this or any previous visit  No results found for this or any previous visit  No valid procedures specified    No results found for this or any previous visit         Meds/Allergies   all current active meds have been reviewed, current meds:   Current Facility-Administered Medications   Medication Dose Route Frequency    acetaminophen (TYLENOL) tablet 650 mg  650 mg Oral Q6H PRN    amLODIPine (NORVASC) tablet 10 mg  10 mg Oral Daily    aspirin chewable tablet 81 mg  81 mg Oral Daily    atorvastatin (LIPITOR) tablet 40 mg  40 mg Oral Daily With Dinner    heparin (porcine) subcutaneous injection 5,000 Units  5,000 Units Subcutaneous Q8H [facility]    hydrALAZINE (APRESOLINE) injection 10 mg  10 mg Intravenous Q6H PRN    insulin lispro (HumaLOG) 100 units/mL subcutaneous injection 1-5 Units  1-5 Units Subcutaneous TID AC    insulin lispro (HumaLOG) 100 units/mL subcutaneous injection 1-5 Units  1-5 Units Subcutaneous HS    labetalol (NORMODYNE) injection 10 mg  10 mg Intravenous Q6H PRN    lisinopril (ZESTRIL) tablet 20 mg  20 mg Oral Daily    metoprolol tartrate (LOPRESSOR) partial tablet 6 25 mg  6 25 mg Oral Q12H CATHERINE    nitroglycerin (NITROSTAT) SL tablet 0 4 mg  0 4 mg Sublingual Q5 Min PRN    potassium chloride (K-DUR,KLOR-CON) CR tablet 40 mEq  40 mEq Oral BID    and PTA meds:   Prior to Admission Medications   Prescriptions Last Dose Informant Patient Reported?  Taking?   acetaminophen (TYLENOL) 500 mg tablet   No No   Sig: Take 2 tablets (1,000 mg total) by mouth every 6 (six) hours as needed for mild pain   amLODIPine (NORVASC) 10 mg tablet   No No   Sig: Take 1 tablet (10 mg total) by mouth daily   aspirin 81 mg chewable tablet   Yes No   Sig: Chew 81 mg daily   atorvastatin (LIPITOR) 20 mg tablet   Yes No   Sig: Take 20 mg by mouth daily   cyclobenzaprine (FLEXERIL) 10 mg tablet   No No   Sig: Take 0 5 tablets (5 mg total) by mouth 2 (two) times a day as needed for muscle spasms   cyclobenzaprine (FLEXERIL) 10 mg tablet   No No   Sig: Take 1 tablet (10 mg total) by mouth 2 (two) times a day as needed for muscle spasms for up to 3 days   diphenhydrAMINE (BENADRYL) 25 mg tablet   No No   Sig: Take 1 tablet (25 mg total) by mouth every 6 (six) hours as needed for itching   lidocaine (LIDODERM) 5 %   No No   Sig: Apply 1 patch topically daily for 4 days Remove & Discard patch within 12 hours or as directed by MD   lisinopril (ZESTRIL) 20 mg tablet   No No   Sig: Take 1 tablet (20 mg total) by mouth daily   metFORMIN (GLUCOPHAGE) 1000 MG tablet   Yes No   Sig: Take 1,000 mg by mouth   mupirocin (BACTROBAN) 2 % ointment   No No   Sig: Apply topically 3 (three) times a day   polymyxin b-trimethoprim (POLYTRIM) ophthalmic solution   No No   Sig: Administer 1 drop to both eyes every 4 (four) hours      Facility-Administered Medications: None     Medications Prior to Admission   Medication    acetaminophen (TYLENOL) 500 mg tablet    amLODIPine (NORVASC) 10 mg tablet    aspirin 81 mg chewable tablet    atorvastatin (LIPITOR) 20 mg tablet    cyclobenzaprine (FLEXERIL) 10 mg tablet    cyclobenzaprine (FLEXERIL) 10 mg tablet    diphenhydrAMINE (BENADRYL) 25 mg tablet    lidocaine (LIDODERM) 5 %    lisinopril (ZESTRIL) 20 mg tablet    metFORMIN (GLUCOPHAGE) 1000 MG tablet    mupirocin (BACTROBAN) 2 % ointment    polymyxin b-trimethoprim (POLYTRIM) ophthalmic solution            Counseling / Coordination of Care  Total floor / unit time spent today 20 minutes  Greater than 50% of total time was spent with the patient and / or family counseling and / or coordination of care  ** Please Note: Dragon 360 Dictation voice to text software may have been used in the creation of this document   **

## 2022-06-04 NOTE — PROGRESS NOTES
INTERNAL MEDICINE RESIDENCY PROGRESS NOTE     Name: Iron Norris   Age & Sex: 67 y o  female   MRN: [id number]  [facility]   Encounter: [id number]  Team: SOD Team A    PATIENT INFORMATION     Name: Iron Norris   Age & Sex: 67 y o  female   MRN: [id number]  Hospital Stay Days: 1    ASSESSMENT/PLAN     Principal Problem:    Hypertensive urgency  Active Problems:    Diabetes mellitus ([address] )    Chest pain, atypical    Hypertriglyceridemia    HLD (hyperlipidemia)      * Hypertensive urgency  Assessment & Plan  Patient has PMH of hypertension, DM2 and dyslipidemia  She presented to the ED today with a BP of 205/93  Patient states she ran out of her lisinopril yesterday and has not taken it today, patient denies recent changes to or non-adherence with diet  She notes 1 5 days of dizziness and chest pain which resolved several hours following administration of lisinopril and nitroglycerine in the ED  Subsequent BP checks showed 182/98 and 169/87  · Continue to monitor BP  · Continue home medications of Amlodipine 10mg daily, Lisinopril 20mg daily and added lopressor 6 25 BID  · Titrate as necessary to maintain -160mmHg  · Stress diet    6/4/2022- Req'd Labetolol 10mg overnight  BP stable       Continue current regimen       Goal <130 / 80       Pending stress test              HLD (hyperlipidemia)  Assessment & Plan    With DM goal <70  Increased Lipitor 20mg to 40mg      Hypertriglyceridemia  Assessment & Plan  TGs in 400s  Not a fasting blood test  Can repeat out patient     Will hold onto any treatment for now  Continue Lipitor 40mg  Chest pain, atypical  Assessment & Plan  Patient presents to ED with a 1 5 day history of  left sided, constant chest pain unrelated to activity and without radiation  Patient reports last dose of Lisinopril was yesterday and she was unable to take  today as her prescription ran out    Patient endorses complete resolution of symptoms following administration of nitroglycerin, aspirin, and lisinopril in the ED  EKG shows nonspecific changes likely in the context of hypertensive urgency,  HS Troponin negative x1  Unremarkable CXR with no acute cardiopulmonary disease  · Nuclear Stress Test  · Stress diet  · NT-BNP  · Trend troponin x3  · Continue home amlodipine 10mg daily, Lisinopril 20mg daily  · Telemetry  · Cardiology consulted, appreciate recommendations   · No chest pain overnight  · Pending Nuclear study     Diabetes mellitus [facility])  Assessment & Plan  Patient has PMH of Type 2 DM  Blood glucose on admission 251, BG 6 months prior 222    · Sliding scale insulin Lispro TID before meals and daily before bedtime with target between 140-180 mg/dl  · POCT Glucose testing  · HBA1c- 6 9  · Increased Atorvastatin to 40mg daily  · Will resume home Metformin at OR  · F/u outpatient PCP for DM2 management      Disposition: Pending Nuclear study     SUBJECTIVE     Patient seen and examined  No acute events overnight  Denies any chest pains over night  Feeling better  Denies any sob, headaches, nausea, vomiting, fever or chills  Examined with Dr Kim Jennings as interpretor  OBJECTIVE     Vitals:    22 0305 22 0356 22 0710 22 0910   BP: 146/76 129/67 126/70 143/88   BP Location:  Left arm     Pulse: 76 66 62    Resp:  18     Temp:  98 °F (36 7 °C)  98 6 °F (37 °C)   TempSrc:  Oral  Oral   SpO2: 98% 98% 98%       Temperature:   Temp (24hrs), Av 5 °F (36 9 °C), Min:98 °F (36 7 °C), Max:98 7 °F (37 1 °C)    Temperature: 98 6 °F (37 °C)  Intake & Output:  I/O       / 0701  06/03 0700 06/ 0701  / 0700    P  O   880    Total Intake  880    Net  +880          Unmeasured Urine Occurrence  2 x        Weights: There is no height or weight on file to calculate BMI  Weight (last 2 days)     None        Physical Exam  Constitutional:       General: She is not in acute distress  Appearance: She is normal weight   She is not ill-appearing, toxic-appearing or diaphoretic  HENT:      Head: Normocephalic and atraumatic  Cardiovascular:      Rate and Rhythm: Normal rate and regular rhythm  Pulses: Normal pulses  Heart sounds: Normal heart sounds  No murmur heard  No friction rub  No gallop  Pulmonary:      Effort: Pulmonary effort is normal  No respiratory distress  Breath sounds: Normal breath sounds  No wheezing or rales  Chest:      Chest wall: No tenderness  Abdominal:      General: Bowel sounds are normal  There is no distension  Palpations: Abdomen is soft  There is no mass  Tenderness: There is no right CVA tenderness or left CVA tenderness  Musculoskeletal:      Right lower leg: No edema  Left lower leg: No edema  Neurological:      General: No focal deficit present  Mental Status: She is alert  Mental status is at baseline  Psychiatric:         Mood and Affect: Mood normal          Behavior: Behavior normal        LABORATORY DATA     Labs: I have personally reviewed pertinent reports  Results from last 7 days   Lab Units 06/04/22  0507 06/03/22  1132   WBC Thousand/uL 11 11* 9 98   HEMOGLOBIN g/dL 13 3 14 0   HEMATOCRIT % 39 3 42 6   PLATELETS Thousands/uL 254 251   NEUTROS PCT % 52 62   MONOS PCT % 7 6      Results from last 7 days   Lab Units 06/04/22  0507 06/03/22  1132   POTASSIUM mmol/L 3 3* 3 8   CHLORIDE mmol/L 103 104   CO2 mmol/L 27 28   BUN mg/dL 14 14   CREATININE mg/dL 0 84 1 16   CALCIUM mg/dL 9 1 9 6   ALK PHOS U/L  --  91   ALT U/L  --  19   AST U/L  --  34                            IMAGING & DIAGNOSTIC TESTING     Radiology Results: I have personally reviewed pertinent reports  XR chest 1 view portable    Result Date: 6/3/2022  Impression: No acute cardiopulmonary disease  Findings are stable Workstation performed: [id number]     Other Diagnostic Testing: I have personally reviewed pertinent reports      ACTIVE MEDICATIONS     Current Facility-Administered Medications   Medication Dose Route Frequency    acetaminophen (TYLENOL) tablet 650 mg  650 mg Oral Q6H PRN    aminophylline 25 mg/mL injection **ADS Override Pull**        amLODIPine (NORVASC) tablet 10 mg  10 mg Oral Daily    aspirin chewable tablet 81 mg  81 mg Oral Daily    atorvastatin (LIPITOR) tablet 40 mg  40 mg Oral Daily With Dinner    heparin (porcine) subcutaneous injection 5,000 Units  5,000 Units Subcutaneous Q8H [facility]    hydrALAZINE (APRESOLINE) injection 10 mg  10 mg Intravenous Q6H PRN    insulin lispro (HumaLOG) 100 units/mL subcutaneous injection 1-5 Units  1-5 Units Subcutaneous TID AC    insulin lispro (HumaLOG) 100 units/mL subcutaneous injection 1-5 Units  1-5 Units Subcutaneous HS    labetalol (NORMODYNE) injection 10 mg  10 mg Intravenous Q6H PRN    lisinopril (ZESTRIL) tablet 20 mg  20 mg Oral Daily    metoprolol tartrate (LOPRESSOR) partial tablet 6 25 mg  6 25 mg Oral Q12H CATHERINE    nitroglycerin (NITROSTAT) SL tablet 0 4 mg  0 4 mg Sublingual Q5 Min PRN    potassium chloride (K-DUR,KLOR-CON) CR tablet 40 mEq  40 mEq Oral BID    regadenoson (LEXISCAN) 0 4 mg/5 mL injection **ADS Override Pull**           VTE Pharmacologic Prophylaxis: Heparin  VTE Mechanical Prophylaxis: sequential compression device    Portions of the record may have been created with voice recognition software  Occasional wrong word or "sound a like" substitutions may have occurred due to the inherent limitations of voice recognition software    Read the chart carefully and recognize, using context, where substitutions have occurred   ==  Karel Toussaint MD  [facility]  Internal Medicine Residency PGY-2

## 2022-06-06 ENCOUNTER — TELEPHONE (OUTPATIENT)
Dept: INTERNAL MEDICINE CLINIC | Facility: CLINIC | Age: 72
End: 2022-06-06

## 2022-06-06 LAB
CHEST PAIN STATEMENT: NORMAL
MAX DIASTOLIC BP: 86 MMHG
MAX HEART RATE: 114 BPM
MAX PREDICTED HEART RATE: 148 BPM
MAX. SYSTOLIC BP: 148 MMHG
PROTOCOL NAME: NORMAL
REASON FOR TERMINATION: NORMAL
TARGET HR FORMULA: NORMAL
TEST INDICATION: NORMAL
TIME IN EXERCISE PHASE: NORMAL

## 2022-06-06 NOTE — TELEPHONE ENCOUNTER
----- Message from Sandra Gleason MD sent at [phone]  6:21 PM EDT -----  Good afternoon,    Ms Carrol Tellez is being discharge from the hospital today after hypertensive urgency  Please call to schedule appointment for TCM  Patient will like to establish care with our clinic  Thanks

## 2022-06-08 NOTE — PHYSICIAN ADVISOR
Current patient class: Inpatient  The patient is currently on Hospital Day: 2 at [address] [place] [address]      The patient was admitted to the hospital  on 6/3/22 at  3:00 PM for the following diagnosis:  Chest pain [R07 9]  Chest pain in adult [R07 9]     After review of the relevant documentation, labs, vital signs and test results, this is a PROVIDER LIABLE case  In this particular case the patient was admitted to the hospital as an inpatient  The patient however failed to satisfy the 2 midnight benchmark and closer scrutiny of the case was warranted  After review of the patient presentation and relevant labs the patient was most appropriate for observation or outpatient class at the time of admission  Given that this patient has already been discharged prior to this review they become a provider liable case  Rationale is as follows: The patient is a 67 yrs   Female who presented to the ED at 6/3/2022 10:56 AM with a chief complaint of Chest Pain (CP and heaviness started yesterday after running out of lisinopril and ASA denies Sob N/D)  Patient ran out of medications and has been noncompliant with medications since not having them  The patient restart medications  Her chest pain did resolve the patient was then discharged the following day  This case is most appropriate for observation status on admission  This case is provider liable      The patients vitals on arrival were   ED Triage Vitals   Temperature Pulse Respirations Blood Pressure SpO2   06/03/22 1027 06/03/22 1027 06/03/22 1027 06/03/22 1027 06/03/22 1027   98 3 °F (36 8 °C) 70 16 (!) 182/98 97 %      Temp Source Heart Rate Source Patient Position - Orthostatic VS BP Location FiO2 (%)   06/03/22 1027 06/03/22 1027 06/03/22 1027 06/03/22 1027 --   Temporal Monitor Sitting Left arm       Pain Score       06/03/22 2100       No Pain           Past Medical History:   Diagnosis Date    Diabetes mellitus ([facility] )  Hypertension      Past Surgical History:   Procedure Laterality Date     SECTION             Consults have been placed to:   IP CONSULT TO CARDIOLOGY    Vitals:    22 0710 22 0910 22 1500 22 1530   BP: 126/70 143/88  152/74   BP Location:       Pulse: 62   76   Resp:       Temp:  98 6 °F (37 °C) 99 °F (37 2 °C)    TempSrc:  Oral Oral    SpO2: 98%   98%       Most recent labs:    No results for input(s): WBC, HGB, HCT, PLT, K, NA, CALCIUM, BUN, CREATININE, LIPASE, AMYLASE, INR, TROPONINI, CKTOTAL, AST, ALT, ALKPHOS, BILITOT in the last 72 hours  Scheduled Meds:  Continuous Infusions:No current facility-administered medications for this encounter  PRN Meds:      Surgical procedures (if appropriate):

## 2022-06-13 NOTE — TELEPHONE ENCOUNTER
3rd VM left for patient to return our call, if she would want to establish care with us or [address] [place] [address]

## 2022-06-23 ENCOUNTER — HOSPITAL ENCOUNTER (EMERGENCY)
Facility: HOSPITAL | Age: 72
Discharge: HOME | End: 2022-06-23
Attending: EMERGENCY MEDICINE
Payer: MEDICARE

## 2022-06-23 VITALS
RESPIRATION RATE: 18 BRPM | DIASTOLIC BLOOD PRESSURE: 87 MMHG | OXYGEN SATURATION: 97 % | SYSTOLIC BLOOD PRESSURE: 167 MMHG | TEMPERATURE: 98.7 F | HEART RATE: 64 BPM

## 2022-06-23 DIAGNOSIS — N76.4 LABIAL ABSCESS: Primary | ICD-10-CM

## 2022-06-23 DIAGNOSIS — L03.818 CELLULITIS OF OTHER SPECIFIED SITE: ICD-10-CM

## 2022-06-23 LAB
ALBUMIN SERPL BCP-MCNC: 3.2 G/DL (ref 3.5–5)
ALP SERPL-CCNC: 97 U/L (ref 46–116)
ALT SERPL W P-5'-P-CCNC: 17 U/L (ref 12–78)
ANION GAP SERPL CALCULATED.3IONS-SCNC: 5 MMOL/L (ref 4–13)
AST SERPL W P-5'-P-CCNC: 13 U/L (ref 5–45)
BASOPHILS # BLD AUTO: 0.06 THOUSANDS/ΜL (ref 0–0.1)
BASOPHILS NFR BLD AUTO: 1 % (ref 0–1)
BILIRUB SERPL-MCNC: 0.3 MG/DL (ref 0.2–1)
BUN SERPL-MCNC: 16 MG/DL (ref 5–25)
CALCIUM ALBUM COR SERPL-MCNC: 9.9 MG/DL (ref 8.3–10.1)
CALCIUM SERPL-MCNC: 9.3 MG/DL (ref 8.3–10.1)
CHLORIDE SERPL-SCNC: 105 MMOL/L (ref 100–108)
CO2 SERPL-SCNC: 28 MMOL/L (ref 21–32)
CREAT SERPL-MCNC: 0.72 MG/DL (ref 0.6–1.3)
EOSINOPHIL # BLD AUTO: 0.39 THOUSAND/ΜL (ref 0–0.61)
EOSINOPHIL NFR BLD AUTO: 4 % (ref 0–6)
ERYTHROCYTE [DISTWIDTH] IN BLOOD BY AUTOMATED COUNT: 12.7 % (ref 11.6–15.1)
EST. AVERAGE GLUCOSE BLD GHB EST-MCNC: 157 MG/DL
GFR SERPL CREATININE-BSD FRML MDRD: 83 ML/MIN/1.73SQ M
GLUCOSE SERPL-MCNC: 148 MG/DL (ref 65–140)
HBA1C MFR BLD: 7.1 %
HCT VFR BLD AUTO: 38.4 % (ref 34.8–46.1)
HGB BLD-MCNC: 12.7 G/DL (ref 11.5–15.4)
IMM GRANULOCYTES # BLD AUTO: 0.06 THOUSAND/UL (ref 0–0.2)
IMM GRANULOCYTES NFR BLD AUTO: 1 % (ref 0–2)
LYMPHOCYTES # BLD AUTO: 3.23 THOUSANDS/ΜL (ref 0.6–4.47)
LYMPHOCYTES NFR BLD AUTO: 30 % (ref 14–44)
MCH RBC QN AUTO: 29.5 PG (ref 26.8–34.3)
MCHC RBC AUTO-ENTMCNC: 33.1 G/DL (ref 31.4–37.4)
MCV RBC AUTO: 89 FL (ref 82–98)
MONOCYTES # BLD AUTO: 0.86 THOUSAND/ΜL (ref 0.17–1.22)
MONOCYTES NFR BLD AUTO: 8 % (ref 4–12)
NEUTROPHILS # BLD AUTO: 6.22 THOUSANDS/ΜL (ref 1.85–7.62)
NEUTS SEG NFR BLD AUTO: 56 % (ref 43–75)
NRBC BLD AUTO-RTO: 0 /100 WBCS
PLATELET # BLD AUTO: 309 THOUSANDS/UL (ref 149–390)
PMV BLD AUTO: 9.4 FL (ref 8.9–12.7)
POTASSIUM SERPL-SCNC: 3.6 MMOL/L (ref 3.5–5.3)
PROT SERPL-MCNC: 7.6 G/DL (ref 6.4–8.2)
RBC # BLD AUTO: 4.3 MILLION/UL (ref 3.81–5.12)
SODIUM SERPL-SCNC: 138 MMOL/L (ref 136–145)
WBC # BLD AUTO: 10.82 THOUSAND/UL (ref 4.31–10.16)

## 2022-06-23 PROCEDURE — 87070 CULTURE OTHR SPECIMN AEROBIC: CPT | Performed by: EMERGENCY MEDICINE

## 2022-06-23 PROCEDURE — 99283 EMERGENCY DEPT VISIT LOW MDM: CPT

## 2022-06-23 PROCEDURE — 87186 SC STD MICRODIL/AGAR DIL: CPT | Performed by: EMERGENCY MEDICINE

## 2022-06-23 PROCEDURE — 36415 COLL VENOUS BLD VENIPUNCTURE: CPT | Performed by: EMERGENCY MEDICINE

## 2022-06-23 PROCEDURE — 64430 NJX AA&/STRD PUDENDAL NERVE: CPT | Performed by: EMERGENCY MEDICINE

## 2022-06-23 PROCEDURE — 96374 THER/PROPH/DIAG INJ IV PUSH: CPT

## 2022-06-23 PROCEDURE — 85025 COMPLETE CBC W/AUTO DIFF WBC: CPT | Performed by: EMERGENCY MEDICINE

## 2022-06-23 PROCEDURE — 87205 SMEAR GRAM STAIN: CPT | Performed by: EMERGENCY MEDICINE

## 2022-06-23 PROCEDURE — 99285 EMERGENCY DEPT VISIT HI MDM: CPT | Performed by: EMERGENCY MEDICINE

## 2022-06-23 PROCEDURE — 80053 COMPREHEN METABOLIC PANEL: CPT | Performed by: EMERGENCY MEDICINE

## 2022-06-23 PROCEDURE — 83036 HEMOGLOBIN GLYCOSYLATED A1C: CPT | Performed by: EMERGENCY MEDICINE

## 2022-06-23 RX ORDER — SULFAMETHOXAZOLE AND TRIMETHOPRIM 800; 160 MG/1; MG/1
1 TABLET ORAL ONCE
Status: COMPLETED | OUTPATIENT
Start: 2022-06-23 | End: 2022-06-23

## 2022-06-23 RX ORDER — LIDOCAINE HYDROCHLORIDE 10 MG/ML
10 INJECTION, SOLUTION EPIDURAL; INFILTRATION; INTRACAUDAL; PERINEURAL ONCE
Status: COMPLETED | OUTPATIENT
Start: 2022-06-23 | End: 2022-06-23

## 2022-06-23 RX ORDER — LIDOCAINE HYDROCHLORIDE 20 MG/ML
10 INJECTION, SOLUTION EPIDURAL; INFILTRATION; INTRACAUDAL; PERINEURAL ONCE
Status: DISCONTINUED | OUTPATIENT
Start: 2022-06-23 | End: 2022-06-23

## 2022-06-23 RX ORDER — HYDROMORPHONE HCL/PF 1 MG/ML
0.5 SYRINGE (ML) INJECTION ONCE
Status: COMPLETED | OUTPATIENT
Start: 2022-06-23 | End: 2022-06-23

## 2022-06-23 RX ORDER — ROPIVACAINE HYDROCHLORIDE 5 MG/ML
15 INJECTION, SOLUTION EPIDURAL; INFILTRATION; PERINEURAL ONCE
Status: COMPLETED | OUTPATIENT
Start: 2022-06-23 | End: 2022-06-23

## 2022-06-23 RX ORDER — SULFAMETHOXAZOLE AND TRIMETHOPRIM 800; 160 MG/1; MG/1
1 TABLET ORAL 2 TIMES DAILY
Qty: 14 TABLET | Refills: 0 | Status: SHIPPED | OUTPATIENT
Start: 2022-06-23 | End: 2022-06-30

## 2022-06-23 RX ADMIN — HYDROMORPHONE HYDROCHLORIDE 0.5 MG: 1 INJECTION, SOLUTION INTRAMUSCULAR; INTRAVENOUS; SUBCUTANEOUS at 17:25

## 2022-06-23 RX ADMIN — LIDOCAINE HYDROCHLORIDE 10 ML: 10 INJECTION, SOLUTION EPIDURAL; INFILTRATION; INTRACAUDAL at 16:08

## 2022-06-23 RX ADMIN — SULFAMETHOXAZOLE AND TRIMETHOPRIM 1 TABLET: 800; 160 TABLET ORAL at 19:37

## 2022-06-23 RX ADMIN — ROPIVACAINE HYDROCHLORIDE 15 ML: 5 INJECTION EPIDURAL; INFILTRATION; PERINEURAL at 17:26

## 2022-06-23 NOTE — ED PROVIDER NOTES
History  Chief Complaint   Patient presents with    Vaginal Pain     Abscess on labia X10 months, pain and discharge     Patient is a 67year old female with a past medical history of hypertension and diabetes who presents for evaluation of a vaginal abscess  Patient states that she has had a wound on the right labia for the past 2 months  States that the area started off as a boil, and then the boil burst  She has had pain and swelling in the area since, and has had purulent discharge from the area as well  Patient states that she did see her PCP for this recently, but states that the PCP did not do anything for the wound at that time  Patient denies any associated fevers, chills, abdominal pain, nausea, or vomiting   used: Yes    Vaginal Pain  Location:  Right labia  Severity:  Severe  Onset quality:  Gradual  Duration:  2 months  Progression:  Worsening  Chronicity:  New  Associated symptoms: no abdominal pain, no fever, no nausea and no vomiting        Prior to Admission Medications   Prescriptions Last Dose Informant Patient Reported?  Taking?   acetaminophen (TYLENOL) 500 mg tablet   No No   Sig: Take 2 tablets (1,000 mg total) by mouth every 6 (six) hours as needed for mild pain   amLODIPine (NORVASC) 10 mg tablet   No No   Sig: Take 1 tablet (10 mg total) by mouth daily   aspirin 81 mg chewable tablet   Yes No   Sig: Chew 81 mg daily   atorvastatin (LIPITOR) 20 mg tablet   Yes No   Sig: Take 20 mg by mouth daily   carvedilol (COREG) 3 125 mg tablet   No No   Sig: Take 1 tablet (3 125 mg total) by mouth 2 (two) times a day with meals   cyclobenzaprine (FLEXERIL) 10 mg tablet   No No   Sig: Take 0 5 tablets (5 mg total) by mouth 2 (two) times a day as needed for muscle spasms   diphenhydrAMINE (BENADRYL) 25 mg tablet   No No   Sig: Take 1 tablet (25 mg total) by mouth every 6 (six) hours as needed for itching   lidocaine (LIDODERM) 5 %   No No   Sig: Apply 1 patch topically daily for 4 days Remove & Discard patch within 12 hours or as directed by MD   lisinopril (ZESTRIL) 20 mg tablet   No No   Sig: Take 1 tablet (20 mg total) by mouth daily   metFORMIN (GLUCOPHAGE) 1000 MG tablet   Yes No   Sig: Take 1,000 mg by mouth   mupirocin (BACTROBAN) 2 % ointment   No No   Sig: Apply topically 3 (three) times a day   polymyxin b-trimethoprim (POLYTRIM) ophthalmic solution   No No   Sig: Administer 1 drop to both eyes every 4 (four) hours      Facility-Administered Medications: None       Past Medical History:   Diagnosis Date    Diabetes mellitus ([facility] [address] )     Hypertension        Past Surgical History:   Procedure Laterality Date     SECTION         History reviewed  No pertinent family history  I have reviewed and agree with the history as documented  E-Cigarette/Vaping    E-Cigarette Use Never User      E-Cigarette/Vaping Substances     Social History     Tobacco Use    Smoking status: Never Smoker    Smokeless tobacco: Never Used   Vaping Use    Vaping Use: Never used   Substance Use Topics    Alcohol use: Never    Drug use: Never        Review of Systems   Constitutional: Negative for chills and fever  HENT: Negative  Respiratory: Negative  Cardiovascular: Negative  Gastrointestinal: Negative for abdominal pain, nausea and vomiting  Genitourinary: Positive for genital sores and vaginal pain  Musculoskeletal: Negative  Skin: Negative  Neurological: Negative  All other systems reviewed and are negative        Physical Exam  ED Triage Vitals   Temperature Pulse Respirations Blood Pressure SpO2   22 1435 22 1435 22 1435 22 1435 22 1436   98 7 °F (37 1 °C) 64 18 167/87 97 %      Temp Source Heart Rate Source Patient Position - Orthostatic VS BP Location FiO2 (%)   22 1435 22 1435 -- -- --   Oral Monitor         Pain Score       22 1725       10 - Worst Possible Pain             Orthostatic Vital Signs  Vitals: 06/23/22 1435   BP: 167/87   Pulse: 64       Physical Exam  Vitals and nursing note reviewed  Exam conducted with a chaperone present  Constitutional:       General: She is awake  She is not in acute distress  Appearance: She is not toxic-appearing  HENT:      Head: Normocephalic and atraumatic  Eyes:      General: Vision grossly intact  Gaze aligned appropriately  Cardiovascular:      Rate and Rhythm: Normal rate and regular rhythm  Pulmonary:      Effort: Pulmonary effort is normal  No respiratory distress  Genitourinary:      Musculoskeletal:      Cervical back: Full passive range of motion without pain and neck supple  Lymphadenopathy:      Lower Body: Right inguinal adenopathy present  Skin:     General: Skin is warm and dry  Neurological:      General: No focal deficit present  Mental Status: She is alert and oriented to person, place, and time           ED Medications  Medications   lidocaine (PF) (XYLOCAINE-MPF) 1 % injection 10 mL (10 mL Infiltration Given by Other 6/23/22 1608)   HYDROmorphone (DILAUDID) injection 0 5 mg (0 5 mg Intravenous Given 6/23/22 1725)   ropivacaine (NAROPIN) 0 5 % injection 15 mL (15 mL Infiltration Given by Other 6/23/22 1726)   sulfamethoxazole-trimethoprim (BACTRIM DS) 800-160 mg per tablet 1 tablet (1 tablet Oral Given 6/23/22 1937)       Diagnostic Studies  Results Reviewed     Procedure Component Value Units Date/Time    Wound culture and Gram stain [[id number]]     Lab Status: No result Specimen: Wound     Comprehensive metabolic panel [[id number]]  (Abnormal) Collected: 06/23/22 1603    Lab Status: Final result Specimen: Blood from Arm, Left Updated: 06/23/22 1652     Sodium 138 mmol/L      Potassium 3 6 mmol/L      Chloride 105 mmol/L      CO2 28 mmol/L      ANION GAP 5 mmol/L      BUN 16 mg/dL      Creatinine 0 72 mg/dL      Glucose 148 mg/dL      Calcium 9 3 mg/dL      Corrected Calcium 9 9 mg/dL      AST 13 U/L      ALT 17 U/L      Alkaline Phosphatase 97 U/L      Total Protein 7 6 g/dL      Albumin 3 2 g/dL      Total Bilirubin 0 30 mg/dL      eGFR 83 ml/min/1 73sq m     Narrative:      [place] guidelines for Chronic Kidney Disease (CKD):     Stage 1 with normal or high GFR (GFR > 90 mL/min/1 73 square meters)    Stage 2 Mild CKD (GFR = 60-89 mL/min/1 73 square meters)    Stage 3A Moderate CKD (GFR = 45-59 mL/min/1 73 square meters)    Stage 3B Moderate CKD (GFR = 30-44 mL/min/1 73 square meters)    Stage 4 Severe CKD (GFR = 15-29 mL/min/1 73 square meters)    Stage 5 End Stage CKD (GFR <15 mL/min/1 73 square meters)  Note: GFR calculation is accurate only with a steady state creatinine    CBC and differential [[id number]]  (Abnormal) Collected: 06/23/22 1603    Lab Status: Final result Specimen: Blood from Arm, Left Updated: 06/23/22 1622     WBC 10 82 Thousand/uL      RBC 4 30 Million/uL      Hemoglobin 12 7 g/dL      Hematocrit 38 4 %      MCV 89 fL      MCH 29 5 pg      MCHC 33 1 g/dL      RDW 12 7 %      MPV 9 4 fL      Platelets 053 Thousands/uL      nRBC 0 /100 WBCs      Neutrophils Relative 56 %      Immat GRANS % 1 %      Lymphocytes Relative 30 %      Monocytes Relative 8 %      Eosinophils Relative 4 %      Basophils Relative 1 %      Neutrophils Absolute 6 22 Thousands/µL      Immature Grans Absolute 0 06 Thousand/uL      Lymphocytes Absolute 3 23 Thousands/µL      Monocytes Absolute 0 86 Thousand/µL      Eosinophils Absolute 0 39 Thousand/µL      Basophils Absolute 0 06 Thousands/µL     Hemoglobin A1C [[id number]] Collected: 06/23/22 1603    Lab Status: In process Specimen: Blood from Arm, Left Updated: 06/23/22 1610                 No orders to display         Procedures  Nerve block    Date/Time: 6/23/2022 5:30 PM  Performed by: Army Marycruz DO  Authorized by: Army Marycruz DO     Patient location:  ED  [place] Protocol:  Consent: Verbal consent obtained    Risks and benefits: risks, benefits and alternatives were discussed  Consent given by: patient  Required items: required blood products, implants, devices, and special equipment available  Patient identity confirmed: arm band      Indications:     Indications:  Procedural anesthesia and pain relief  Location:     Body area:  Trunk    Trunk nerve: pudendal      Laterality:  Right  Pre-procedure details:     Skin preparation:  2% chlorhexidine    Preparation: Patient was prepped and draped in usual sterile fashion    Skin anesthesia (see MAR for exact dosages):     Skin anesthesia method:  None  Procedure details (see MAR for exact dosages): Block needle gauge:  21 G    Block anesthetic: Ropivicaine  Steroid injected:  None    Additive injected:  None    Injection procedure:  Anatomic landmarks palpated, anatomic landmarks identified, introduced needle, negative aspiration for blood and incremental injection  Post-procedure details:     Dressing:  None    Outcome:  Pain improved    Patient tolerance of procedure: Tolerated well, no immediate complications          ED Course                                       MDM  Number of Diagnoses or Management Options  Cellulitis of other specified site: new and requires workup  Labial abscess: new and requires workup  Diagnosis management comments: 67year old female presents with an abscess of the right labia that has been worsening over the past 2 months  Patient reports worsening pain and purulent drainage from the area  On exam, patient with open wound with an underlying loculated abscess on the anterior right labia  Surrounding tissue is erythematous and warm to the touch  No active drainage noted from the wound, but dried purulent material noted at the opening of the wound  Patient also with inguinal lymphadenopathy on the right  Patient given IV dilaudid for pain relief  Attempted a pudendal nerve block for pain relief, but patient only reported mild relief of pain with this procedure   Local anesthetic was then injected into the wound in an attempt to de-loculate the abscess, but patient was unable to tolerate deloculation even with local anesthetic  Wound cultures obtained and sent for analysis  CBC and CMP within normal limits at this time  Spoke with on-call gynecology fellow who suggested that patient be started on bactrim for treatment of the abscess and local cellulitis  Patient was offered admission for possible OR deloculation vs outpatient follow up, and patient states that she would prefer to follow up as an outpatient at this time  Patient was sent with a prescription for bactrim, given gynecology referral, and discharged to home in stable condition with symptomatic care instructions (including instructions for sitz baths) and strict ED return precautions  Amount and/or Complexity of Data Reviewed  Clinical lab tests: ordered and reviewed  Discuss the patient with other providers: yes    Patient Progress  Patient progress: stable      Disposition  Final diagnoses:   Labial abscess   Cellulitis of other specified site     Time reflects when diagnosis was documented in both MDM as applicable and the Disposition within this note     Time User Action Codes Description Comment    6/23/2022  7:07 PM Elmira Mast Add [N76 4] Labial abscess     6/23/2022  7:07 PM Elmira Mast Add [L03 818] Cellulitis of other specified site       ED Disposition     ED Disposition   Discharge    Condition   Stable    Date/Time   Thu Jun 23, 2022  7:10 PM    Comment   Tegan Martinez discharge to home/self care                 Follow-up Information     Follow up With Specialties Details Why Contact Info Additional [address] [facility] Schedule an appointment as soon as possible for a visit in 3 days  [address] [place] [phone]  [place], [place] [address], [place], [place], [address]    Vanessa Feng MD Family Medicine Schedule an appointment as soon as possible for a visit  As needed [id number] [facility] Emergency Department Emergency Medicine Go to  If symptoms worsen [address] Emergency Department, [address], [place], [place], [address] [facility]          Patient's Medications   Discharge Prescriptions    SULFAMETHOXAZOLE-TRIMETHOPRIM (BACTRIM DS) 800-160 MG PER TABLET    Take 1 tablet by mouth 2 (two) times a day for 7 days smx-tmp DS (BACTRIM) 800-160 mg tabs (1tab q12 D10)       Start Date: 6/23/2022 End Date: 6/30/2022       Order Dose: 1 tablet       Quantity: 14 tablet    Refills: 0         PDMP Review     None           ED Provider  Attending physically available and evaluated Avery Matamoros I managed the patient along with the ED Attending      Electronically Signed by         Cecilia Ruiz DO  07/03/22 4245

## 2022-06-24 NOTE — ED ATTENDING ATTESTATION
6/23/2022  I saw and evaluated the patient  I have discussed the patient with the resident physician and agree with the resident's findings, assessment and plan as documented in the resident physician's note, unless otherwise documented below  All available laboratory and imaging studies were reviewed by myself  I was present for key portions of any procedure(s) performed by the resident and I was immediately available to provide assistance  I agree with the current assessment done in the Emergency Department  I have conducted an independent evaluation of this patient  Emergency Department Note- Roberta Moss 67 y o  female MRN: [id number]    Unit/Bed#: [facility] Encounter: [id number]    Chief Complaint   Patient presents with    Vaginal Pain     Abscess on labia X10 months, pain and discharge       Roberta Moss is a 67 y o  female presenting with painful swollen right labia with purulent drainage  Patient reports that symptoms started 2 months ago (triage note states 10 months), when she developed a painful lump to the right labia which started draining purulent drainage  Patient reports that she did note this to her primary care physician, however, reports that nothing was done about this  Patient has not been treated with antibiotics  She is presenting today since pain is worsened, right labia is more swollen  It is quite uncomfortable, particularly with ambulation and sitting down  This area has not been biopsied previously  Patient record notes history of diabetes, however, patient denies this, she denies taking metformin  Patient speaks Lebanese, Cyrophone  assisted with interpretation      REVIEW OF SYSTEMS    Constitutional: denies fevers, chills  Visual/Eyes: no changes in vision  HENT: no rhinorrhea, no sore throat  Cardiac: no chest pain  Respiratory: no shortness of breath, no cough  GI: no abdominal pain, nausea, vomiting, or diarrhea  :  As above, no burning with urination  Heme/Onc: no easy bruising  Endocrine:  Denies diabetes  Neuro: no focal weakness or numbness, no headaches    Ten systems reviewed and negative unless otherwise noted in HPI and above    PAST MEDICAL HISTORY  Past Medical History:   Diagnosis Date    Diabetes mellitus ([facility] [address] )     Hypertension        SURGICAL HISTORY  Past Surgical History:   Procedure Laterality Date     SECTION         FAMILY HISTORY  History reviewed  No pertinent family history  CURRENT MEDICATIONS  No current facility-administered medications on file prior to encounter       Current Outpatient Medications on File Prior to Encounter   Medication Sig    acetaminophen (TYLENOL) 500 mg tablet Take 2 tablets (1,000 mg total) by mouth every 6 (six) hours as needed for mild pain    amLODIPine (NORVASC) 10 mg tablet Take 1 tablet (10 mg total) by mouth daily    aspirin 81 mg chewable tablet Chew 81 mg daily    atorvastatin (LIPITOR) 20 mg tablet Take 20 mg by mouth daily    carvedilol (COREG) 3 125 mg tablet Take 1 tablet (3 125 mg total) by mouth 2 (two) times a day with meals    cyclobenzaprine (FLEXERIL) 10 mg tablet Take 0 5 tablets (5 mg total) by mouth 2 (two) times a day as needed for muscle spasms    diphenhydrAMINE (BENADRYL) 25 mg tablet Take 1 tablet (25 mg total) by mouth every 6 (six) hours as needed for itching    lidocaine (LIDODERM) 5 % Apply 1 patch topically daily for 4 days Remove & Discard patch within 12 hours or as directed by MD    lisinopril (ZESTRIL) 20 mg tablet Take 1 tablet (20 mg total) by mouth daily    metFORMIN (GLUCOPHAGE) 1000 MG tablet Take 1,000 mg by mouth    mupirocin (BACTROBAN) 2 % ointment Apply topically 3 (three) times a day    polymyxin b-trimethoprim (POLYTRIM) ophthalmic solution Administer 1 drop to both eyes every 4 (four) hours       ALLERGIES  Allergies   Allergen Reactions    Caffeine - Food Allergy Other (See Comments)    Penicillins Other (See Comments) SOCIAL HISTORY  Social History     Socioeconomic History    Marital status: Single     Spouse name: None    Number of children: None    Years of education: None    Highest education level: None   Occupational History    None   Tobacco Use    Smoking status: Never Smoker    Smokeless tobacco: Never Used   Vaping Use    Vaping Use: Never used   Substance and Sexual Activity    Alcohol use: Never    Drug use: Never    Sexual activity: None   Other Topics Concern    None   Social History Narrative    ** Merged History Encounter **          Social Determinants of Health     Financial Resource Strain: Not on file   Food Insecurity: Not on file   Transportation Needs: Not on file   Physical Activity: Not on file   Stress: Not on file   Social Connections: Not on file   Intimate Partner Violence: Not on file   Housing Stability: Not on file       PHYSICAL EXAM  /87   Pulse 64   Temp 98 7 °F (37 1 °C) (Oral)   Resp 18   SpO2 97%   Vital signs and nursing notes reviewed    CONSTITUTIONAL: female appearing stated age resting in bed, in no acute distress  HEENT: atraumatic, normocephalic  Sclera anicteric, conjunctiva are not injected  Moist oral mucosa  CARDIOVASCULAR/CHEST: RRR, no M/R/G  2+ radial pulses  PULMONARY: Breathing comfortably on RA  Breath sounds are equal and clear to auscultation  ABDOMEN: non-distended  BS present, normoactive  Non-tender  Examination of external female genitalia reveals marked enlargement of right labia majora with some erythema, with what appears to be congealed purulent matter, some of it is draining  There is palpable right inguinal lymph node enlargement which is tender to palpation  MSK: moves all extremities, no deformities, no peripheral edema, no calf asymmetry  NEURO: Awake, alert, and oriented x 3  Face symmetric  Moves all extremities spontaneously   No focal neurologic deficits  SKIN: Warm, appears well-perfused  MENTAL STATUS: Normal affect Media Information                  Document Information    Clinical Image - Mobile Device      06/23/2022 18:44   Attached To: Hospital Encounter on 6/23/22     Source [address] , DO  [facility]         DIAGNOSTIC STUDIES  Results Reviewed     Procedure Component Value Units Date/Time    Hemoglobin A1C [[id number]]  (Abnormal) Collected: 06/23/22 1603    Lab Status: Final result Specimen: Blood from Arm, Left Updated: 06/23/22 1954     Hemoglobin A1C 7 1 %       mg/dl     Wound culture and Gram stain [[id number]] Collected: 06/23/22 1949    Lab Status:  In process Specimen: Wound from Labia Updated: 06/23/22 1952    Comprehensive metabolic panel [[id number]]  (Abnormal) Collected: 06/23/22 1603    Lab Status: Final result Specimen: Blood from Arm, Left Updated: 06/23/22 1652     Sodium 138 mmol/L      Potassium 3 6 mmol/L      Chloride 105 mmol/L      CO2 28 mmol/L      ANION GAP 5 mmol/L      BUN 16 mg/dL      Creatinine 0 72 mg/dL      Glucose 148 mg/dL      Calcium 9 3 mg/dL      Corrected Calcium 9 9 mg/dL      AST 13 U/L      ALT 17 U/L      Alkaline Phosphatase 97 U/L      Total Protein 7 6 g/dL      Albumin 3 2 g/dL      Total Bilirubin 0 30 mg/dL      eGFR 83 ml/min/1 73sq m     Narrative:      Deny guidelines for Chronic Kidney Disease (CKD):     Stage 1 with normal or high GFR (GFR > 90 mL/min/1 73 square meters)    Stage 2 Mild CKD (GFR = 60-89 mL/min/1 73 square meters)    Stage 3A Moderate CKD (GFR = 45-59 mL/min/1 73 square meters)    Stage 3B Moderate CKD (GFR = 30-44 mL/min/1 73 square meters)    Stage 4 Severe CKD (GFR = 15-29 mL/min/1 73 square meters)    Stage 5 End Stage CKD (GFR <15 mL/min/1 73 square meters)  Note: GFR calculation is accurate only with a steady state creatinine    CBC and differential [[id number]]  (Abnormal) Collected: 06/23/22 1603    Lab Status: Final result Specimen: Blood from Arm, Left Updated: 06/23/22 1622 WBC 10 82 Thousand/uL      RBC 4 30 Million/uL      Hemoglobin 12 7 g/dL      Hematocrit 38 4 %      MCV 89 fL      MCH 29 5 pg      MCHC 33 1 g/dL      RDW 12 7 %      MPV 9 4 fL      Platelets 400 Thousands/uL      nRBC 0 /100 WBCs      Neutrophils Relative 56 %      Immat GRANS % 1 %      Lymphocytes Relative 30 %      Monocytes Relative 8 %      Eosinophils Relative 4 %      Basophils Relative 1 %      Neutrophils Absolute 6 22 Thousands/µL      Immature Grans Absolute 0 06 Thousand/uL      Lymphocytes Absolute 3 23 Thousands/µL      Monocytes Absolute 0 86 Thousand/µL      Eosinophils Absolute 0 39 Thousand/µL      Basophils Absolute 0 06 Thousands/µL           No orders to display       PROCEDURES  Procedures            ED COURSE  Medications   lidocaine (PF) (XYLOCAINE-MPF) 1 % injection 10 mL (10 mL Infiltration Given by Other 6/23/22 1608)   HYDROmorphone (DILAUDID) injection 0 5 mg (0 5 mg Intravenous Given 6/23/22 1725)   ropivacaine (NAROPIN) 0 5 % injection 15 mL (15 mL Infiltration Given by Other 6/23/22 1726)   sulfamethoxazole-trimethoprim (BACTRIM DS) 800-160 mg per tablet 1 tablet (1 tablet Oral Given 6/23/22 237)     55-year-old female presenting with right labial swelling, purulent drainage, and pain over the past 2 months  Vital signs reviewed, afebrile, hypertensive, within normal limits otherwise  Differential diagnosis includes cellulitis, abscess, but, unfortunately, also includes malignancy, particularly given persistence of symptoms  We did attempt bedside incision and drainage, however, despite IV analgesia and peripheral nerve block, as well as local infiltration, patient is unable to tolerate the procedure given pain, and there is also not much drainage possible given congealed contents of the area  Case discussed with gynecology, they recommend a close outpatient follow-up  In the meantime, we are starting patient on a course of Bactrim    Patient's labs do indicate that hemoglobin A1c is elevated, 7 1, suggesting diabetes  Recommend close follow-up with primary care physician  Patient discharged to home with recommendations for symptom control, return precautions, and plan for follow up       CLINICAL IMPRESSION  Final diagnoses:   Labial abscess   Cellulitis of other specified site       Discharge Medication List as of 6/23/2022  7:24 PM      START taking these medications    Details   sulfamethoxazole-trimethoprim (BACTRIM DS) 800-160 mg per tablet Take 1 tablet by mouth 2 (two) times a day for 7 days smx-tmp DS (BACTRIM) 800-160 mg tabs (1tab q12 D10), Starting Thu 6/23/2022, Until Thu 6/30/2022, Normal         CONTINUE these medications which have NOT CHANGED    Details   acetaminophen (TYLENOL) 500 mg tablet Take 2 tablets (1,000 mg total) by mouth every 6 (six) hours as needed for mild pain, Starting Mon 4/12/2021, Print      amLODIPine (NORVASC) 10 mg tablet Take 1 tablet (10 mg total) by mouth daily, Starting Tue 4/20/2021, Normal      aspirin 81 mg chewable tablet Chew 81 mg daily, Historical Med      atorvastatin (LIPITOR) 20 mg tablet Take 20 mg by mouth daily, Historical Med      carvedilol (COREG) 3 125 mg tablet Take 1 tablet (3 125 mg total) by mouth 2 (two) times a day with meals, Starting Sat 6/4/2022, Until Mon 7/4/2022, Normal      cyclobenzaprine (FLEXERIL) 10 mg tablet Take 0 5 tablets (5 mg total) by mouth 2 (two) times a day as needed for muscle spasms, Starting Wed 11/17/2021, Normal      diphenhydrAMINE (BENADRYL) 25 mg tablet Take 1 tablet (25 mg total) by mouth every 6 (six) hours as needed for itching, Starting Sat 3/6/2021, Print      lidocaine (LIDODERM) 5 % Apply 1 patch topically daily for 4 days Remove & Discard patch within 12 hours or as directed by MD, Starting Sun 12/5/2021, Until Thu 12/9/2021, Normal      lisinopril (ZESTRIL) 20 mg tablet Take 1 tablet (20 mg total) by mouth daily, Starting Mon 6/21/2021, Print      metFORMIN (GLUCOPHAGE) 1000 MG tablet Take 1,000 mg by mouth, Starting Fri 3/12/2021, Until Sat 3/12/2022, Historical Med      mupirocin (BACTROBAN) 2 % ointment Apply topically 3 (three) times a day, Starting Sun 1/17/2021, Normal      polymyxin b-trimethoprim (POLYTRIM) ophthalmic solution Administer 1 drop to both eyes every 4 (four) hours, Starting Sun 1/17/2021, Normal

## 2022-06-26 LAB
BACTERIA WND AEROBE CULT: ABNORMAL
GRAM STN SPEC: ABNORMAL
GRAM STN SPEC: ABNORMAL

## 2022-06-28 ENCOUNTER — TELEPHONE (OUTPATIENT)
Dept: OBGYN CLINIC | Facility: CLINIC | Age: 72
End: 2022-06-28

## 2022-06-28 NOTE — TELEPHONE ENCOUNTER
----- Message from Thornell Lesch, MD sent at 6/24/2022  1:44 PM EDT -----  Good afternoon! This patient was seen in the ED for a labial abscess  She needs an appointment for early next week for follow up  Thank you!    Vick Drew

## 2022-07-18 ENCOUNTER — HOSPITAL ENCOUNTER (EMERGENCY)
Facility: HOSPITAL | Age: 72
Discharge: HOME | End: 2022-07-18
Attending: EMERGENCY MEDICINE
Payer: MEDICARE

## 2022-07-18 ENCOUNTER — APPOINTMENT (EMERGENCY)
Dept: RADIOLOGY | Facility: HOSPITAL | Age: 72
End: 2022-07-18
Payer: MEDICARE

## 2022-07-18 VITALS
TEMPERATURE: 97.3 F | DIASTOLIC BLOOD PRESSURE: 93 MMHG | SYSTOLIC BLOOD PRESSURE: 198 MMHG | HEART RATE: 60 BPM | OXYGEN SATURATION: 97 % | RESPIRATION RATE: 24 BRPM

## 2022-07-18 DIAGNOSIS — I10 HYPERTENSION: ICD-10-CM

## 2022-07-18 DIAGNOSIS — R07.9 CHEST PAIN, UNSPECIFIED: Primary | ICD-10-CM

## 2022-07-18 LAB
ANION GAP SERPL CALCULATED.3IONS-SCNC: 9 MMOL/L (ref 4–13)
ATRIAL RATE: 67 BPM
BASOPHILS # BLD AUTO: 0.06 THOUSANDS/ΜL (ref 0–0.1)
BASOPHILS NFR BLD AUTO: 1 % (ref 0–1)
BUN SERPL-MCNC: 17 MG/DL (ref 5–25)
CALCIUM SERPL-MCNC: 9.3 MG/DL (ref 8.3–10.1)
CARDIAC TROPONIN I PNL SERPL HS: 4 NG/L (ref 8–18)
CHLORIDE SERPL-SCNC: 104 MMOL/L (ref 96–108)
CO2 SERPL-SCNC: 28 MMOL/L (ref 21–32)
CREAT SERPL-MCNC: 0.94 MG/DL (ref 0.6–1.3)
EOSINOPHIL # BLD AUTO: 0.33 THOUSAND/ΜL (ref 0–0.61)
EOSINOPHIL NFR BLD AUTO: 3 % (ref 0–6)
ERYTHROCYTE [DISTWIDTH] IN BLOOD BY AUTOMATED COUNT: 12.9 % (ref 11.6–15.1)
GFR SERPL CREATININE-BSD FRML MDRD: 60 ML/MIN/1.73SQ M
GLUCOSE SERPL-MCNC: 148 MG/DL (ref 65–140)
HCT VFR BLD AUTO: 45.5 % (ref 34.8–46.1)
HGB BLD-MCNC: 14.4 G/DL (ref 11.5–15.4)
IMM GRANULOCYTES # BLD AUTO: 0.07 THOUSAND/UL (ref 0–0.2)
IMM GRANULOCYTES NFR BLD AUTO: 1 % (ref 0–2)
LYMPHOCYTES # BLD AUTO: 2.8 THOUSANDS/ΜL (ref 0.6–4.47)
LYMPHOCYTES NFR BLD AUTO: 28 % (ref 14–44)
MCH RBC QN AUTO: 29.1 PG (ref 26.8–34.3)
MCHC RBC AUTO-ENTMCNC: 31.6 G/DL (ref 31.4–37.4)
MCV RBC AUTO: 92 FL (ref 82–98)
MONOCYTES # BLD AUTO: 0.77 THOUSAND/ΜL (ref 0.17–1.22)
MONOCYTES NFR BLD AUTO: 8 % (ref 4–12)
NEUTROPHILS # BLD AUTO: 5.93 THOUSANDS/ΜL (ref 1.85–7.62)
NEUTS SEG NFR BLD AUTO: 59 % (ref 43–75)
NRBC BLD AUTO-RTO: 0 /100 WBCS
P AXIS: 60 DEGREES
PLATELET # BLD AUTO: 309 THOUSANDS/UL (ref 149–390)
PMV BLD AUTO: 9.9 FL (ref 8.9–12.7)
POTASSIUM SERPL-SCNC: 4.1 MMOL/L (ref 3.5–5.3)
PR INTERVAL: 166 MS
QRS AXIS: 56 DEGREES
QRSD INTERVAL: 72 MS
QT INTERVAL: 388 MS
QTC INTERVAL: 409 MS
RBC # BLD AUTO: 4.94 MILLION/UL (ref 3.81–5.12)
SODIUM SERPL-SCNC: 141 MMOL/L (ref 135–147)
T WAVE AXIS: 60 DEGREES
VENTRICULAR RATE: 67 BPM
WBC # BLD AUTO: 9.96 THOUSAND/UL (ref 4.31–10.16)

## 2022-07-18 PROCEDURE — 99285 EMERGENCY DEPT VISIT HI MDM: CPT

## 2022-07-18 PROCEDURE — 85025 COMPLETE CBC W/AUTO DIFF WBC: CPT

## 2022-07-18 PROCEDURE — 80048 BASIC METABOLIC PNL TOTAL CA: CPT

## 2022-07-18 PROCEDURE — 99285 EMERGENCY DEPT VISIT HI MDM: CPT | Performed by: EMERGENCY MEDICINE

## 2022-07-18 PROCEDURE — 93005 ELECTROCARDIOGRAM TRACING: CPT

## 2022-07-18 PROCEDURE — 84484 ASSAY OF TROPONIN QUANT: CPT

## 2022-07-18 PROCEDURE — 93010 ELECTROCARDIOGRAM REPORT: CPT | Performed by: INTERNAL MEDICINE

## 2022-07-18 PROCEDURE — 36415 COLL VENOUS BLD VENIPUNCTURE: CPT

## 2022-07-18 PROCEDURE — 71045 X-RAY EXAM CHEST 1 VIEW: CPT

## 2022-07-18 RX ORDER — AMLODIPINE BESYLATE 10 MG/1
10 TABLET ORAL DAILY
Qty: 10 TABLET | Refills: 0 | Status: SHIPPED | OUTPATIENT
Start: 2022-07-18

## 2022-07-18 RX ORDER — CARVEDILOL 3.12 MG/1
3.12 TABLET ORAL ONCE
Status: COMPLETED | OUTPATIENT
Start: 2022-07-18 | End: 2022-07-18

## 2022-07-18 RX ORDER — CARVEDILOL 3.12 MG/1
3.12 TABLET ORAL 2 TIMES DAILY WITH MEALS
Qty: 20 TABLET | Refills: 0 | Status: SHIPPED | OUTPATIENT
Start: 2022-07-18

## 2022-07-18 RX ORDER — LISINOPRIL 20 MG/1
20 TABLET ORAL ONCE
Status: COMPLETED | OUTPATIENT
Start: 2022-07-18 | End: 2022-07-18

## 2022-07-18 RX ADMIN — LISINOPRIL 20 MG: 20 TABLET ORAL at 13:56

## 2022-07-18 RX ADMIN — CARVEDILOL 3.12 MG: 3.12 TABLET, FILM COATED ORAL at 13:56

## 2022-07-18 NOTE — DISCHARGE INSTRUCTIONS
Please fill your prescriptions for Norvasc and Coreg  Please take your lisinopril, Norvasc, and Coreg dry  Please follow up with her PCP at her appointment this Friday    Please return to the emergency department if you develop any new or worsening symptoms, such as severe chest pain, shortness of breath, or passing out

## 2022-07-18 NOTE — ED PROVIDER NOTES
History  Chief Complaint   Patient presents with    Chest Pain     Reports chest pain, back pain, b/l arm pain, reports running out of her ASA and lisinopril over a week ago  Reports having chest pain for 3 days, mild and continuous per      Patient is a 79-year-old Kazakh-speaking female with past medical history of hypertension, hyperlipidemia, type 2 diabetes, and atypical chest pain who presents with chest pain  Patient has been having chest pain for the past 5 days, located under her left breast and radiating to bilateral arms and legs  She says this pain is not worse with exertion and is relieved when she takes her lisinopril at home  She denies any shortness of breath, nausea, abdominal pain  Patient was at [facility] in early June with similar complaints and was admitted and discharged with unremarkable findings with the exception of an EF of 24%  Patient had trouble following up for cardiology appointments due to inappropriate contact information  She has also had trouble accessing her prescriptions  She denies any recent travel, surgeries, cancer, or history of blood clots  She has a family history of heart attacks in her father who passed away from an MI  Prior to Admission Medications   Prescriptions Last Dose Informant Patient Reported?  Taking?   acetaminophen (TYLENOL) 500 mg tablet Not Taking at Unknown time  No No   Sig: Take 2 tablets (1,000 mg total) by mouth every 6 (six) hours as needed for mild pain   Patient not taking: Reported on 7/18/2022   amLODIPine (NORVASC) 10 mg tablet Not Taking at Unknown time  No No   Sig: Take 1 tablet (10 mg total) by mouth daily   Patient not taking: Reported on 7/18/2022   aspirin 81 mg chewable tablet Past Month at Unknown time  Yes Yes   Sig: Chew 81 mg daily   atorvastatin (LIPITOR) 20 mg tablet Not Taking at Unknown time  Yes No   Sig: Take 20 mg by mouth daily   Patient not taking: Reported on 7/18/2022   carvedilol (COREG) 3 125 mg tablet   No No   Sig: Take 1 tablet (3 125 mg total) by mouth 2 (two) times a day with meals   cyclobenzaprine (FLEXERIL) 10 mg tablet Not Taking at Unknown time  No No   Sig: Take 0 5 tablets (5 mg total) by mouth 2 (two) times a day as needed for muscle spasms   Patient not taking: Reported on 2022   diphenhydrAMINE (BENADRYL) 25 mg tablet Not Taking at Unknown time  No No   Sig: Take 1 tablet (25 mg total) by mouth every 6 (six) hours as needed for itching   Patient not taking: Reported on 2022   lidocaine (LIDODERM) 5 %   No No   Sig: Apply 1 patch topically daily for 4 days Remove & Discard patch within 12 hours or as directed by MD   lisinopril (ZESTRIL) 20 mg tablet Past Month at Unknown time  No Yes   Sig: Take 1 tablet (20 mg total) by mouth daily   metFORMIN (GLUCOPHAGE) 1000 MG tablet   Yes No   Sig: Take 1,000 mg by mouth   mupirocin (BACTROBAN) 2 % ointment Not Taking at Unknown time  No No   Sig: Apply topically 3 (three) times a day   Patient not taking: Reported on 2022   polymyxin b-trimethoprim (POLYTRIM) ophthalmic solution Not Taking at Unknown time  No No   Sig: Administer 1 drop to both eyes every 4 (four) hours   Patient not taking: Reported on 2022      Facility-Administered Medications: None       Past Medical History:   Diagnosis Date    Diabetes mellitus ([facility] [address] )     Hypertension        Past Surgical History:   Procedure Laterality Date     SECTION         History reviewed  No pertinent family history  I have reviewed and agree with the history as documented  E-Cigarette/Vaping    E-Cigarette Use Never User      E-Cigarette/Vaping Substances     Social History     Tobacco Use    Smoking status: Never Smoker    Smokeless tobacco: Never Used   Vaping Use    Vaping Use: Never used   Substance Use Topics    Alcohol use: Never    Drug use: Never        Review of Systems   Constitutional: Negative for chills and fever     HENT: Negative for congestion, rhinorrhea and sore throat  Eyes: Negative for pain and redness  Respiratory: Negative for cough, shortness of breath and wheezing  Cardiovascular: Positive for chest pain  Negative for palpitations and leg swelling  Gastrointestinal: Negative for abdominal pain, diarrhea, nausea and vomiting  Genitourinary: Negative for dysuria and frequency  Musculoskeletal: Negative for back pain and myalgias  Skin: Negative for rash and wound  Neurological: Negative for weakness and headaches  Psychiatric/Behavioral: Negative for confusion and decreased concentration  Physical Exam  ED Triage Vitals   Temperature Pulse Respirations Blood Pressure SpO2   07/18/22 1211 07/18/22 1211 07/18/22 1211 07/18/22 1212 07/18/22 1211   (!) 97 3 °F (36 3 °C) 71 18 (!) 194/98 97 %      Temp src Heart Rate Source Patient Position - Orthostatic VS BP Location FiO2 (%)   -- 07/18/22 1245 07/18/22 1345 07/18/22 1245 --    Monitor Lying Left arm       Pain Score       07/18/22 1245       2             Orthostatic Vital Signs  Vitals:    07/18/22 1212 07/18/22 1245 07/18/22 1345 07/18/22 1430   BP: (!) 194/98 (!) 192/91 (!) 188/91 (!) 198/93   Pulse:  66 66 60   Patient Position - Orthostatic VS:   Lying        Physical Exam  Constitutional:       General: She is not in acute distress  Appearance: She is well-developed and normal weight  She is not ill-appearing, toxic-appearing or diaphoretic  HENT:      Head: Normocephalic and atraumatic  Cardiovascular:      Rate and Rhythm: Normal rate and regular rhythm  No extrasystoles are present  Pulses:           Radial pulses are 2+ on the right side and 2+ on the left side  Heart sounds: Heart sounds not distant  No murmur heard  No systolic murmur is present  No diastolic murmur is present  No friction rub  No gallop  No S3 or S4 sounds  Pulmonary:      Effort: Pulmonary effort is normal  No tachypnea or respiratory distress        Breath sounds: Normal breath sounds  Chest:      Chest wall: No tenderness  Abdominal:      General: Bowel sounds are normal       Palpations: Abdomen is soft  Tenderness: There is no abdominal tenderness  Musculoskeletal:      Right lower leg: No tenderness  No edema  Left lower leg: No tenderness  No edema  Skin:     General: Skin is warm and dry  Neurological:      Mental Status: She is alert     Psychiatric:         Mood and Affect: Mood normal          Behavior: Behavior normal          ED Medications  Medications   lisinopril (ZESTRIL) tablet 20 mg (20 mg Oral Given 7/18/22 1356)   carvedilol (COREG) tablet 3 125 mg (3 125 mg Oral Given 7/18/22 1356)       Diagnostic Studies  Results Reviewed     Procedure Component Value Units Date/Time    Basic metabolic panel [[id number]]  (Abnormal) Collected: 07/18/22 1338    Lab Status: Final result Specimen: Blood from Arm, Right Updated: 07/18/22 1425     Sodium 141 mmol/L      Potassium 4 1 mmol/L      Chloride 104 mmol/L      CO2 28 mmol/L      ANION GAP 9 mmol/L      BUN 17 mg/dL      Creatinine 0 94 mg/dL      Glucose 148 mg/dL      Calcium 9 3 mg/dL      eGFR 60 ml/min/1 73sq m     Narrative:      [place] guidelines for Chronic Kidney Disease (CKD):     Stage 1 with normal or high GFR (GFR > 90 mL/min/1 73 square meters)    Stage 2 Mild CKD (GFR = 60-89 mL/min/1 73 square meters)    Stage 3A Moderate CKD (GFR = 45-59 mL/min/1 73 square meters)    Stage 3B Moderate CKD (GFR = 30-44 mL/min/1 73 square meters)    Stage 4 Severe CKD (GFR = 15-29 mL/min/1 73 square meters)    Stage 5 End Stage CKD (GFR <15 mL/min/1 73 square meters)  Note: GFR calculation is accurate only with a steady state creatinine    High Sensitivity Troponin I Random [[id number]]  (Abnormal) Collected: 07/18/22 1338    Lab Status: Final result Specimen: Blood from Arm, Right Updated: 07/18/22 1422     HS TnI random 4 ng/L     CBC and differential [[id number]] Collected: 07/18/22 1338    Lab Status: Final result Specimen: Blood from Arm, Right Updated: 07/18/22 1358     WBC 9 96 Thousand/uL      RBC 4 94 Million/uL      Hemoglobin 14 4 g/dL      Hematocrit 45 5 %      MCV 92 fL      MCH 29 1 pg      MCHC 31 6 g/dL      RDW 12 9 %      MPV 9 9 fL      Platelets 044 Thousands/uL      nRBC 0 /100 WBCs      Neutrophils Relative 59 %      Immat GRANS % 1 %      Lymphocytes Relative 28 %      Monocytes Relative 8 %      Eosinophils Relative 3 %      Basophils Relative 1 %      Neutrophils Absolute 5 93 Thousands/µL      Immature Grans Absolute 0 07 Thousand/uL      Lymphocytes Absolute 2 80 Thousands/µL      Monocytes Absolute 0 77 Thousand/µL      Eosinophils Absolute 0 33 Thousand/µL      Basophils Absolute 0 06 Thousands/µL                  XR chest 1 view portable   Final Result by Jesse Schroeder MD (07/18 8646)      No acute cardiopulmonary disease  Workstation performed: [id number]               Procedures  Procedures      ED Course  ED Course as of 07/18/22 1623   Mon Jul 18, 2022   1501 EKG normal, troponin less than 4, on the labs unremarkable  Heart score 4  Blood pressure elevated to 198/93              HEART Risk Score    Flowsheet Row Most Recent Value   Heart Score Risk Calculator    History 0 Filed at: 07/18/2022 1500   ECG 0 Filed at: 07/18/2022 1500   Age 2 Filed at: 07/18/2022 1500   Risk Factors 2 Filed at: 07/18/2022 1500   Troponin 0 Filed at: 07/18/2022 1500   HEART Score 4 Filed at: 07/18/2022 1500                                MDM  Number of Diagnoses or Management Options  Chest pain, unspecified: established and improving  Hypertension: minor  Diagnosis management comments: Patient is a 77-year-old female with history of hypertension, hyperlipidemia, diabetes, and atypical chest pain who presents with left-sided, radiating chest pain    Differential diagnosis includes but is not limited to NSTEMI, PE, musculoskeletal pain, and others  Will perform cardiac workup  Will assess barriers to patient follow up       Labs unremarkable, EKG no new acute findings, chest x-ray normal   Patient apparently was not getting her phone calls for follow-up appointments and was not able to get her prescriptions from the pharmacy for blood pressure  Will contact case management to help the patient make her appointment and will provide patient with her blood pressure prescriptions before leaving the ED, patient has PCP follow-up this Friday  Amount and/or Complexity of Data Reviewed  Clinical lab tests: ordered and reviewed  Tests in the radiology section of CPT®: ordered and reviewed  Review and summarize past medical records: yes  Discuss the patient with other providers: yes    Patient Progress  Patient progress: improved      Disposition  Final diagnoses:   Chest pain, unspecified   Hypertension     Time reflects when diagnosis was documented in both MDM as applicable and the Disposition within this note     Time User Action Codes Description Comment    7/18/2022  3:15 PM Norma Gutierrez Add [R07 9] Chest pain, unspecified     7/18/2022  3:15 PM Norma Gutierrez Add [I10] Hypertension       ED Disposition     ED Disposition   Discharge    Condition   Good    Date/Time   Mon Jul 18, 2022  3:17 PM    [address] discharge to home/self care                 Follow-up Information     Follow up With Specialties Details Why Contact Info Additional Information    Rena Vasquez MD Family Medicine Go on 7/22/2022  [address] [facility] Emergency Department Emergency Medicine  If symptoms worsen [address] [id number]   [facility] [address] Emergency [address], [address], [place], [place], [address]          Discharge Medication List as of 7/18/2022  3:21 PM      START taking these medications Details   !! amLODIPine (NORVASC) 10 mg tablet Take 1 tablet (10 mg total) by mouth daily, Starting Mon 7/18/2022, Print       !! - Potential duplicate medications found  Please discuss with provider  CONTINUE these medications which have CHANGED    Details   carvedilol (COREG) 3 125 mg tablet Take 1 tablet (3 125 mg total) by mouth 2 (two) times a day with meals, Starting Mon 7/18/2022, Print         CONTINUE these medications which have NOT CHANGED    Details   acetaminophen (TYLENOL) 500 mg tablet Take 2 tablets (1,000 mg total) by mouth every 6 (six) hours as needed for mild pain, Starting Mon 4/12/2021, Print      !! amLODIPine (NORVASC) 10 mg tablet Take 1 tablet (10 mg total) by mouth daily, Starting Tue 4/20/2021, Normal      aspirin 81 mg chewable tablet Chew 81 mg daily, Historical Med      atorvastatin (LIPITOR) 20 mg tablet Take 20 mg by mouth daily, Historical Med      cyclobenzaprine (FLEXERIL) 10 mg tablet Take 0 5 tablets (5 mg total) by mouth 2 (two) times a day as needed for muscle spasms, Starting Wed 11/17/2021, Normal      diphenhydrAMINE (BENADRYL) 25 mg tablet Take 1 tablet (25 mg total) by mouth every 6 (six) hours as needed for itching, Starting Sat 3/6/2021, Print      lidocaine (LIDODERM) 5 % Apply 1 patch topically daily for 4 days Remove & Discard patch within 12 hours or as directed by MD, Starting Sun 12/5/2021, Until Thu 12/9/2021, Normal      lisinopril (ZESTRIL) 20 mg tablet Take 1 tablet (20 mg total) by mouth daily, Starting Mon 6/21/2021, Print      metFORMIN (GLUCOPHAGE) 1000 MG tablet Take 1,000 mg by mouth, Starting Fri 3/12/2021, Until Sat 3/12/2022, Historical Med      mupirocin (BACTROBAN) 2 % ointment Apply topically 3 (three) times a day, Starting Sun 1/17/2021, Normal      polymyxin b-trimethoprim (POLYTRIM) ophthalmic solution Administer 1 drop to both eyes every 4 (four) hours, Starting Sun 1/17/2021, Normal       !! - Potential duplicate medications found   Please discuss with provider  No discharge procedures on file  PDMP Review     None           ED Provider  Attending physically available and evaluated [address]  I managed the patient along with the ED Attending      Electronically Signed by         Radha Oneal MD  07/18/22 0978

## 2022-07-18 NOTE — CASE MANAGEMENT
Case Management ED Assessment    Patient name Jamie James  Location ED 08/ED 08 MRN [id number]  : 1950 Date 2022        OBJECTIVE:  Predictive Model Details         80% Factor Value    Risk of Hospital Admission or ED Visit Model Number of ED Visits 4     Has Medicaid Yes     Is in Relationship No     Number of Hospitalizations 1     Has Medicare Yes     Has Diabetes Yes     Has PCP Yes       Chief Complaint: Chest pain   Patient Class: Emergency  Preferred Pharmacy:   [facility], [facility] [address] [facility]  Phone: [phone] Fax: [phone] [facility] [address] [place] [address]  Phone: [phone] Fax: [phone]    [address] [place] [address], [address] [facility]  [address]  [address] [place] [address]  Phone: [phone] Fax: [phone]    Primary Care Provider: Claudetta Lew, MD    Primary Insurance: MEDICARE  Secondary Insurance: [place] [address]    ED ASSESSMENT:  Active Health Care Proxies    There are no active Health Care Proxies on file          Readmission Root Cause  30 Day Readmission: No    Outpatient Care Information  Have you seen a doctor in the last year?: No  What barriers (if any) have you encountered getting to scheduled follow-up appointments?: No Transportation, Unreliable phone service    Prescribed Medications Prior to Admission/ED Visit  Has patient been prescribed medications (prior to this ED visit/admission)?: Yes  Any difficulty obtaining medications?: Yes  Has the patient been taking all prescribed medication(s)?: Yes  Does the patient have difficulty affording medication(s)?: No    Patient Information  Admitted from[de-identified] Home  Mental Status: Alert  During Assessment patient was accompanied by: Not accompanied during assessment  Assessment information provided by[de-identified] Patient  Primary Caregiver: Self  Support Systems: Spouse/significant other, Son, Daughter  [place] of Residence: [address] [place] [address] do you live in?: [place]  In the last 12 months, was there a time when you were not able to pay the mortgage or rent on time?: No  In the last 12 months, how many places have you lived?: 1  In the last 12 months, was there a time when you did not have a steady place to sleep or slept in a shelter (including now)?: No  Homeless/housing insecurity resource given?: No  Living Arrangements: Lives w/ Spouse/significant other    Patient Information Continued  Income Source: Pension/penitentiary  Does patient have prescription coverage?: Yes  Within the past 12 months, you worried that your food would run out before you got the money to buy more : Never true  Within the past 12 months, the food you bought just didn't last and you didn't have money to get more : Never true  Food insecurity resource given?: No  Does patient receive dialysis treatments?: No  Does patient have a history of substance abuse?: No  Does patient have a history of Mental Health Diagnosis?: No    Food and Transportation  What is patient's usual means of transportation?: Family Transport  Ask patient:  How would you describe your eating habits?: Good

## 2022-07-18 NOTE — CASE MANAGEMENT
Case Management ED Discharge Planning Note    Patient name Lorene Julian  Location ED 08/ED 08 MRN [id number]  : 1950 Date 2022        OBJECTIVE:  Predictive Model Details         80% Factor Value    Risk of Hospital Admission or ED Visit Model Number of ED Visits 4     Has Medicaid Yes     Is in Relationship No     Number of Hospitalizations 1     Has Medicare Yes     Has Diabetes Yes     Has PCP Yes       Chief Complaint: Chest pain   Patient Class: Emergency  Preferred Pharmacy:   [facility] [address] [facility], [address], [address] [employer] [facility]  Phone: [phone] Fax: [phone] [place], [place] - [address] [address]  Phone: [phone] Fax: [phone] [address] [facility], [address] [facility]  [address]  [address] [place] [address]  Phone: [phone] Fax: [phone]    Primary Care Provider: Octavio Yin MD    Primary Insurance: MEDICARE  Secondary Insurance: [address]    ED Discharge Details:    Discharge planning discussed with[de-identified] Patient  Freedom of Choice: Yes     CM contacted family/caregiver?: Yes (Pt alert and oriented)      Contacts  Patient Contacts: Michael Oneil (Daughter)  Relationship to Patient[de-identified] Family  Contact Method: Phone  Phone Number: [phone]  Reason/Outcome: Other (Comment) ( left)     Other Referral/Resources/Interventions Provided:  Interventions: PCP     Additional Comments: Pt states that she does not have a PCP and she does not know what doctor prescribed her medications in the past   Pt thnks her daughter might know  CM reached out to [place] several times however the phone went directly to  without ringing CM requested a call back  Pt was agreeable to CM attempting to schedule for Cardiology and for a PCP with [facility]    CM reached out to  cardiology in [facility] ([phone]) and attempted to make an appointment but they state that a  will have to contact pt directly as they are scheduling out through September  CM reached out to [facility] [address] and was able to schedule pt for a new pt appointment on Friday 7/22 at 1400  Pt was provided with written documentation of the appointments, the addresses and the phone numbers translated to [facility] [place]  Using an interpretor ([id number]) CM verbally reviewing appointments and that cardiology will call to schedule with pt but if she does not hear from them she can call as she now has the number  Pt states no further needs and states that family can take her to her doctors appointments      Follow-up Appointment Information  Follow up appointment scheduled with[de-identified] [facility]  Follow up appointments scheduled for the following [facility][de-identified] PCP (7/22 at 1400)

## 2022-08-06 NOTE — ED ATTENDING ATTESTATION
7/18/2022  IAna MD, saw and evaluated the patient  I have discussed the patient with the resident/non-physician practitioner and agree with the resident's/non-physician practitioner's findings, Plan of Care, and MDM as documented in the resident's/non-physician practitioner's note, except where noted  All available labs and Radiology studies were reviewed  I was present for key portions of any procedure(s) performed by the resident/non-physician practitioner and I was immediately available to provide assistance  At this point I agree with the current assessment done in the Emergency Department  I have conducted an independent evaluation of this patient a history and physical is as follows:    ED Course     Patient presents for evaluation due to 5 days of left-sided chest pain  Patient states that the pain is not worse with exertion  Patient had a recent admission for similar complaints  At that time, she was discharged on lisinopril, amlodipine, and Coreg  Patient thought she was only supposed to be taking lisinopril  She ran out for lisinopril and aspirin and has not been taking that recently either  Patient has not followed up with cardiology since leaving the hospital   When asked, patient states that the number listed is her 's and she does not know why she is receiving the messages  She also states that her daughter is the 1 who handles her appointments  Of note, patient was found to have an injection fraction of 24%  No additional complaint  Exam: AAOx3, NAD, RRR, CTA, S/NT/ND, no motor/sensory deficits  A/P:  Chest pain  Likely due to uncontrolled hypertension  Will have case management work with patient regarding making follow-up appointments and insuring that she can have ongoing care  Will give some  for all medications at this time  Will check cardiac labs        Critical Care Time  ECG 12 Lead Documentation Only    Date/Time: 7/18/2022 12:35 PM  Performed by: Severiano Pnito MD  Authorized by:  Severiano Pinto MD     Patient location:  ED  Previous ECG:     Previous ECG:  Compared to current    Similarity:  No change  Interpretation:     Interpretation: normal    Rate:     ECG rate:  67    ECG rate assessment: normal    Rhythm:     Rhythm: sinus rhythm    Ectopy:     Ectopy: none    QRS:     QRS axis:  Normal  Conduction:     Conduction: normal    ST segments:     ST segments:  Normal  T waves:     T waves: normal